# Patient Record
Sex: FEMALE | URBAN - METROPOLITAN AREA
[De-identification: names, ages, dates, MRNs, and addresses within clinical notes are randomized per-mention and may not be internally consistent; named-entity substitution may affect disease eponyms.]

---

## 2018-04-16 ENCOUNTER — APPOINTMENT (RX ONLY)
Dept: URBAN - METROPOLITAN AREA CLINIC 23 | Facility: CLINIC | Age: 58
Setting detail: DERMATOLOGY
End: 2018-04-16

## 2018-04-16 DIAGNOSIS — L82.1 OTHER SEBORRHEIC KERATOSIS: ICD-10-CM

## 2018-04-16 DIAGNOSIS — I83.9 ASYMPTOMATIC VARICOSE VEINS OF LOWER EXTREMITIES: ICD-10-CM

## 2018-04-16 DIAGNOSIS — Z71.89 OTHER SPECIFIED COUNSELING: ICD-10-CM

## 2018-04-16 DIAGNOSIS — L82.0 INFLAMED SEBORRHEIC KERATOSIS: ICD-10-CM

## 2018-04-16 DIAGNOSIS — D18.0 HEMANGIOMA: ICD-10-CM

## 2018-04-16 DIAGNOSIS — D22 MELANOCYTIC NEVI: ICD-10-CM

## 2018-04-16 DIAGNOSIS — L81.4 OTHER MELANIN HYPERPIGMENTATION: ICD-10-CM

## 2018-04-16 DIAGNOSIS — L81.8 OTHER SPECIFIED DISORDERS OF PIGMENTATION: ICD-10-CM

## 2018-04-16 PROBLEM — L40.0 PSORIASIS VULGARIS: Status: ACTIVE | Noted: 2018-04-16

## 2018-04-16 PROBLEM — D22.62 MELANOCYTIC NEVI OF LEFT UPPER LIMB, INCLUDING SHOULDER: Status: ACTIVE | Noted: 2018-04-16

## 2018-04-16 PROBLEM — L85.3 XEROSIS CUTIS: Status: ACTIVE | Noted: 2018-04-16

## 2018-04-16 PROBLEM — D48.5 NEOPLASM OF UNCERTAIN BEHAVIOR OF SKIN: Status: ACTIVE | Noted: 2018-04-16

## 2018-04-16 PROBLEM — L55.1 SUNBURN OF SECOND DEGREE: Status: ACTIVE | Noted: 2018-04-16

## 2018-04-16 PROBLEM — D22.5 MELANOCYTIC NEVI OF TRUNK: Status: ACTIVE | Noted: 2018-04-16

## 2018-04-16 PROBLEM — D22.72 MELANOCYTIC NEVI OF LEFT LOWER LIMB, INCLUDING HIP: Status: ACTIVE | Noted: 2018-04-16

## 2018-04-16 PROBLEM — H91.90 UNSPECIFIED HEARING LOSS, UNSPECIFIED EAR: Status: ACTIVE | Noted: 2018-04-16

## 2018-04-16 PROBLEM — L29.8 OTHER PRURITUS: Status: ACTIVE | Noted: 2018-04-16

## 2018-04-16 PROBLEM — F41.9 ANXIETY DISORDER, UNSPECIFIED: Status: ACTIVE | Noted: 2018-04-16

## 2018-04-16 PROBLEM — D18.01 HEMANGIOMA OF SKIN AND SUBCUTANEOUS TISSUE: Status: ACTIVE | Noted: 2018-04-16

## 2018-04-16 PROBLEM — I83.91 ASYMPTOMATIC VARICOSE VEINS OF RIGHT LOWER EXTREMITY: Status: ACTIVE | Noted: 2018-04-16

## 2018-04-16 PROCEDURE — ? BIOPSY BY SHAVE METHOD

## 2018-04-16 PROCEDURE — ? BENIGN DESTRUCTION

## 2018-04-16 PROCEDURE — 17110 DESTRUCTION B9 LES UP TO 14: CPT

## 2018-04-16 PROCEDURE — 11100: CPT | Mod: 59

## 2018-04-16 PROCEDURE — ? COUNSELING

## 2018-04-16 PROCEDURE — 11101: CPT

## 2018-04-16 PROCEDURE — 99203 OFFICE O/P NEW LOW 30 MIN: CPT | Mod: 25

## 2018-04-16 ASSESSMENT — LOCATION ZONE DERM
LOCATION ZONE: LEG
LOCATION ZONE: ARM
LOCATION ZONE: TRUNK
LOCATION ZONE: FACE

## 2018-04-16 ASSESSMENT — LOCATION DETAILED DESCRIPTION DERM
LOCATION DETAILED: SUPERIOR THORACIC SPINE
LOCATION DETAILED: LEFT ANTERIOR DISTAL UPPER ARM
LOCATION DETAILED: EPIGASTRIC SKIN
LOCATION DETAILED: RIGHT DISTAL MEDIAL CALF
LOCATION DETAILED: LEFT MEDIAL SUPERIOR CHEST
LOCATION DETAILED: RIGHT SUPERIOR UPPER BACK
LOCATION DETAILED: LEFT SUPERIOR MEDIAL UPPER BACK
LOCATION DETAILED: LEFT INFERIOR MEDIAL UPPER BACK
LOCATION DETAILED: LEFT DISTAL POSTERIOR UPPER ARM
LOCATION DETAILED: LEFT DISTAL POSTERIOR THIGH
LOCATION DETAILED: LEFT LATERAL ABDOMEN
LOCATION DETAILED: LEFT ANTECUBITAL SKIN
LOCATION DETAILED: LEFT ANTERIOR DISTAL THIGH
LOCATION DETAILED: LEFT SUPERIOR LATERAL MIDBACK
LOCATION DETAILED: LEFT PROXIMAL DORSAL FOREARM
LOCATION DETAILED: SUPERIOR LUMBAR SPINE
LOCATION DETAILED: LEFT INFERIOR LATERAL UPPER BACK
LOCATION DETAILED: RIGHT MEDIAL SUPERIOR CHEST
LOCATION DETAILED: RIGHT RIB CAGE
LOCATION DETAILED: RIGHT INFERIOR UPPER BACK
LOCATION DETAILED: LEFT MEDIAL UPPER BACK
LOCATION DETAILED: LEFT INFERIOR CENTRAL MALAR CHEEK
LOCATION DETAILED: LEFT INFERIOR UPPER BACK
LOCATION DETAILED: INFERIOR THORACIC SPINE
LOCATION DETAILED: LEFT ELBOW
LOCATION DETAILED: RIGHT ANTERIOR DISTAL THIGH
LOCATION DETAILED: LEFT ANTERIOR PROXIMAL THIGH
LOCATION DETAILED: PERIUMBILICAL SKIN
LOCATION DETAILED: LEFT PROXIMAL PRETIBIAL REGION
LOCATION DETAILED: RIGHT SUPERIOR LATERAL MIDBACK

## 2018-04-16 ASSESSMENT — LOCATION SIMPLE DESCRIPTION DERM
LOCATION SIMPLE: LEFT POSTERIOR UPPER ARM
LOCATION SIMPLE: RIGHT THIGH
LOCATION SIMPLE: RIGHT CALF
LOCATION SIMPLE: LEFT POSTERIOR THIGH
LOCATION SIMPLE: CHEST
LOCATION SIMPLE: LEFT UPPER ARM
LOCATION SIMPLE: LEFT UPPER BACK
LOCATION SIMPLE: LEFT THIGH
LOCATION SIMPLE: LEFT ELBOW
LOCATION SIMPLE: LOWER BACK
LOCATION SIMPLE: LEFT FOREARM
LOCATION SIMPLE: RIGHT UPPER BACK
LOCATION SIMPLE: LEFT CHEEK
LOCATION SIMPLE: UPPER BACK
LOCATION SIMPLE: ABDOMEN
LOCATION SIMPLE: RIGHT LOWER BACK
LOCATION SIMPLE: LEFT LOWER BACK
LOCATION SIMPLE: LEFT PRETIBIAL REGION

## 2018-04-16 NOTE — PROCEDURE: BENIGN DESTRUCTION
Include Z78.9 (Other Specified Conditions Influencing Health Status) As An Associated Diagnosis?: No
Treatment Number (Will Not Render If 0): 0
Anesthesia Volume In Cc: 0.3
Post-Care Instructions: I reviewed with the patient in detail post-care instructions. Patient is to wear sunprotection, and avoid picking at any of the treated lesions. Pt may apply Vaseline to crusted or scabbing areas.
Detail Level: Simple
Render Post-Care Instructions In Note?: yes
Medical Necessity Information: It is in your best interest to select a reason for this procedure from the list below. All of these items fulfill various CMS LCD requirements except the new and changing color options.
Medical Necessity Clause: This procedure was medically necessary because the lesions that were treated were:
Consent: The patient's consent was obtained including but not limited to risks of crusting, scabbing, blistering, scarring, darker or lighter pigmentary change, recurrence, incomplete removal and infection.

## 2018-04-16 NOTE — PROCEDURE: BIOPSY BY SHAVE METHOD
Type Of Destruction Used: Curettage
Consent: Written consent was obtained and risks were reviewed including but not limited to scarring, infection, bleeding, scabbing, incomplete removal, nerve damage and allergy to anesthesia.
X Size Of Lesion In Cm: 0
Curettage Text: The wound bed was treated with curettage after the biopsy was performed.
Electrodesiccation And Curettage Text: The wound bed was treated with electrodesiccation and curettage after the biopsy was performed.
Cryotherapy Text: The wound bed was treated with cryotherapy after the biopsy was performed.
Notification Instructions: Patient will be notified of biopsy results. However, patient instructed to call the office if not contacted within 2 weeks.  Results will be faxed to PCP when they are available.
Post-Care Instructions: I reviewed with the patient in detail post-care instructions. Patient is to keep the biopsy site dry overnight, and then apply vaseline twice daily until healed.
Anesthesia Type: 1% lidocaine with epinephrine
Wound Care: Vaseline
Bill For Surgical Tray: no
Electrodesiccation Text: The wound bed was treated with electrodesiccation after the biopsy was performed.
Hemostasis: Aluminum Chloride
Render Post-Care Instructions In Note?: yes
Anesthesia Volume In Cc: 0.5
Lab: [id number]
Biopsy Method: Dermablade
Billing Type: Third-Party Bill
Silver Nitrate Text: The wound bed was treated with silver nitrate after the biopsy was performed.
Detail Level: Detailed
Dressing: bandage
Biopsy Type: H and E

## 2020-06-14 LAB — SARS-COV-2 N GENE RESP QL NAA+PROBE: NOT DETECTED

## 2020-06-14 PROCEDURE — 87635 SARS-COV-2 COVID-19 AMP PRB: CPT | Performed by: INTERNAL MEDICINE

## 2020-06-17 ENCOUNTER — ANESTHESIA EVENT (OUTPATIENT)
Dept: GASTROENTEROLOGY | Facility: HOSPITAL | Age: 60
End: 2020-06-17

## 2020-06-17 ENCOUNTER — HOSPITAL ENCOUNTER (OUTPATIENT)
Facility: HOSPITAL | Age: 60
Setting detail: HOSPITAL OUTPATIENT SURGERY
Discharge: HOME | End: 2020-06-17
Attending: INTERNAL MEDICINE | Admitting: INTERNAL MEDICINE

## 2020-06-17 ENCOUNTER — ANESTHESIA (OUTPATIENT)
Dept: GASTROENTEROLOGY | Facility: HOSPITAL | Age: 60
End: 2020-06-17

## 2020-06-17 VITALS
OXYGEN SATURATION: 98 % | HEART RATE: 63 BPM | TEMPERATURE: 98 F | WEIGHT: 144.25 LBS | BODY MASS INDEX: 23.18 KG/M2 | HEIGHT: 66 IN | SYSTOLIC BLOOD PRESSURE: 110 MMHG | RESPIRATION RATE: 16 BRPM | DIASTOLIC BLOOD PRESSURE: 59 MMHG

## 2020-06-17 DIAGNOSIS — Z86.010 PERSONAL HISTORY OF COLONIC POLYPS: ICD-10-CM

## 2020-06-17 DIAGNOSIS — K21.9 ESOPHAGEAL REFLUX: ICD-10-CM

## 2020-06-17 PROCEDURE — 25010000002 PROPOFOL 10 MG/ML EMULSION: Performed by: NURSE ANESTHETIST, CERTIFIED REGISTERED

## 2020-06-17 PROCEDURE — 87071 CULTURE AEROBIC QUANT OTHER: CPT | Performed by: INTERNAL MEDICINE

## 2020-06-17 RX ORDER — DEXTROSE AND SODIUM CHLORIDE 5; .45 G/100ML; G/100ML
30 INJECTION, SOLUTION INTRAVENOUS CONTINUOUS PRN
Status: DISCONTINUED | OUTPATIENT
Start: 2020-06-17 | End: 2020-06-17 | Stop reason: HOSPADM

## 2020-06-17 RX ORDER — LIDOCAINE HYDROCHLORIDE 20 MG/ML
INJECTION, SOLUTION INTRAVENOUS AS NEEDED
Status: DISCONTINUED | OUTPATIENT
Start: 2020-06-17 | End: 2020-06-17 | Stop reason: SURG

## 2020-06-17 RX ORDER — ONDANSETRON 2 MG/ML
4 INJECTION INTRAMUSCULAR; INTRAVENOUS ONCE AS NEEDED
Status: DISCONTINUED | OUTPATIENT
Start: 2020-06-17 | End: 2020-06-17 | Stop reason: HOSPADM

## 2020-06-17 RX ORDER — PROPOFOL 10 MG/ML
VIAL (ML) INTRAVENOUS AS NEEDED
Status: DISCONTINUED | OUTPATIENT
Start: 2020-06-17 | End: 2020-06-17 | Stop reason: SURG

## 2020-06-17 RX ADMIN — PROPOFOL 80 MG: 10 INJECTION, EMULSION INTRAVENOUS at 15:13

## 2020-06-17 RX ADMIN — PROPOFOL 20 MG: 10 INJECTION, EMULSION INTRAVENOUS at 15:19

## 2020-06-17 RX ADMIN — DEXTROSE AND SODIUM CHLORIDE 30 ML/HR: 5; 450 INJECTION, SOLUTION INTRAVENOUS at 15:06

## 2020-06-17 RX ADMIN — LIDOCAINE HYDROCHLORIDE 60 MG: 20 INJECTION, SOLUTION INTRAVENOUS at 15:13

## 2020-06-17 RX ADMIN — PROPOFOL 20 MG: 10 INJECTION, EMULSION INTRAVENOUS at 15:21

## 2020-06-17 RX ADMIN — PROPOFOL 20 MG: 10 INJECTION, EMULSION INTRAVENOUS at 15:15

## 2020-06-17 RX ADMIN — PROPOFOL 20 MG: 10 INJECTION, EMULSION INTRAVENOUS at 15:25

## 2020-06-17 RX ADMIN — PROPOFOL 20 MG: 10 INJECTION, EMULSION INTRAVENOUS at 15:17

## 2020-06-17 RX ADMIN — PROPOFOL 20 MG: 10 INJECTION, EMULSION INTRAVENOUS at 15:28

## 2020-06-17 NOTE — ANESTHESIA POSTPROCEDURE EVALUATION
Patient: Nani Anthony    Procedure Summary     Date:  06/17/20 Room / Location:  [facility] ENDOSCOPY 2 / [facility] ENDOSCOPY    Anesthesia Start:  1508 Anesthesia Stop:  1536    Procedures:       ESOPHAGOGASTRODUODENOSCOPY (N/A )      COLONOSCOPY (N/A ) Diagnosis:       Esophageal reflux      Personal history of colonic polyps      (Esophageal reflux [K21.9])      (Personal history of colonic polyps [Z86.010])    Surgeon:  Pool Wolfe DO Provider:  Regan Myers CRNA    Anesthesia Type:  MAC ASA Status:  2          Anesthesia Type: MAC    Vitals  No vitals data found for the desired time range.          Post Anesthesia Care and Evaluation    Patient location during evaluation: PACU  Level of consciousness: awake  Pain score: 0  Pain management: adequate  Airway patency: patent  Anesthetic complications: No anesthetic complications  PONV Status: none  Cardiovascular status: acceptable and hemodynamically stable  Respiratory status: acceptable and spontaneous ventilation  Hydration status: acceptable

## 2020-06-17 NOTE — ANESTHESIA PREPROCEDURE EVALUATION
Anesthesia Evaluation     Patient summary reviewed   NPO Solid Status: > 8 hours  NPO Liquid Status: > 4 hours           Airway   Mallampati: II  TM distance: >3 FB  Dental      Pulmonary - normal exam   (+) a smoker Current Smoked day of surgery,   Cardiovascular - negative cardio ROS and normal exam        Neuro/Psych  GI/Hepatic/Renal/Endo    (+)  GERD,      Musculoskeletal     Abdominal    Substance History      OB/GYN          Other   arthritis,      ROS/Med Hx Other: Hearing loss                Anesthesia Plan    ASA 2     MAC     intravenous induction     Anesthetic plan, all risks, benefits, and alternatives have been provided, discussed and informed consent has been obtained with: patient.

## 2020-06-17 NOTE — H&P
" Mariah Schroeder DO,[facility]  Gastroenterology  Hepatology  Endoscopy  Board Certified in Internal Medicine and gastroenterology  [address], [address]  [place]. [address]  - [phone]   F - [phone]     GASTROENTEROLOGY HISTORY AND PHYSICAL  NOTE   MARIAH SCHROEDER DO.         SUBJECTIVE:   6/17/2020    Name: Nani Anthony  DOD: 1960        Chief Complaint:     Subjective : Dyspepsia.  Personal history of colon polyps.  Generalized abdominal pain.  Irritable bowel syndrome with diarrhea    Patient is 60 y.o. female presents with desire for elective EGD with biopsy and culture as well as colonoscopy with biopsy.      ROS/HISTORY/ CURRENT MEDICATIONS/OBJECTIVE/VS/PE:   Review of Systems:  All systems unremarkable unless specified below.  Constitutional   HENT  Eyes   Respiratory    Cardiovascular  Gastrointestinal   Endocrine  Genitourinary    Musculoskeletal   Skin  Allergic/Immunologic    Neurological    Hematological  Psychiatric/Behavioral    History:   No past medical history on file.  No past surgical history on file.  No family history on file.  Social History     Tobacco Use   • Smoking status: Not on file   Substance Use Topics   • Alcohol use: Not on file   • Drug use: Not on file     Prior to Admission medications    Not on File     Allergies:  Patient has no allergy information on record.    I have reviewed the patients medical history, surgical history and family history in the available medical record system.     Current Medications:     No current facility-administered medications for this encounter.      No current outpatient medications on file.       Objective     Physical Exam:   BP: ()/()   Arterial Line BP: ()/()   /59   Pulse 63   Temp 98 °F (36.7 °C)   Resp 16   Ht 167.6 cm (66\")   Wt 65.4 kg (144 lb 4 oz)   SpO2 98%   BMI 23.28 kg/m²   Physical Exam:  General Appearance:    Alert, cooperative, in no acute distress   Head:    Normocephalic, without obvious " abnormality, atraumatic   Eyes:            Lids and lashes normal, conjunctivae and sclerae normal, no   icterus, no pallor, corneas clear, PERRLA   Ears:    Ears appear intact with no abnormalities noted   Throat:   No oral lesions, no thrush, oral mucosa moist   Neck:   No adenopathy, supple, trachea midline, no thyromegaly, no     carotid bruit, no JVD   Back:     No kyphosis present, no scoliosis present, no skin lesions,       erythema or scars, no tenderness to percussion or                   palpation,   range of motion normal   Lungs:     Clear to auscultation,respirations regular, even and                   unlabored    Heart:    Regular rhythm and normal rate, normal S1 and S2, no            murmur, no gallop, no rub, no click   Breast Exam:    Deferred   Abdomen:     Normal bowel sounds, no masses, no organomegaly, soft        non-tender, non-distended, no guarding, no rebound                 tenderness   Genitalia:    Deferred   Extremities:   Moves all extremities well, no edema, no cyanosis, no              redness   Pulses:   Pulses palpable and equal bilaterally   Skin:   No bleeding, bruising or rash   Lymph nodes:   No palpable adenopathy   Neurologic:   Cranial nerves 2 - 12 grossly intact, sensation intact, DTR        present and equal bilaterally      Results Review:     No results found for: WBC, HGB, HCT, PLT          No results found for: LIPASE  No results found for: INR  No results found for: BLOODCX    Radiology Review:  Imaging Results (Last 72 Hours)     ** No results found for the last 72 hours. **           I reviewed the patient's new clinical results.  I reviewed the patient's new imaging results and agree with the interpretation.     ASSESSMENT/PLAN:   ASSESSMENT:  1.  Dyspepsia  2.  Generalized abdominal pain  3.  Personal history of colon polyps  4.  Diarrhea, functional.  Rule out microscopic or collagenous colitis    PLAN:  1.  Esophagogastroduodenoscopy with biopsy and  culture  2.  Colonoscopy with biopsy    Risk and benefits associated with the procedure are reviewed with the patient.  The patient wished to proceed     Pool Wolfe DO  06/17/20  12:49

## 2020-06-19 LAB — BACTERIA SPEC AEROBE CULT: NORMAL

## 2020-06-22 LAB
LAB AP CASE REPORT: NORMAL
PATH REPORT.FINAL DX SPEC: NORMAL

## 2020-12-29 ENCOUNTER — APPOINTMENT (OUTPATIENT)
Dept: MRI IMAGING | Facility: HOSPITAL | Age: 60
End: 2020-12-29

## 2021-01-07 ENCOUNTER — HOSPITAL ENCOUNTER (OUTPATIENT)
Dept: MRI IMAGING | Facility: HOSPITAL | Age: 61
Discharge: HOME | End: 2021-01-07
Admitting: INTERNAL MEDICINE

## 2021-01-07 DIAGNOSIS — R51.9 NONINTRACTABLE HEADACHE, UNSPECIFIED CHRONICITY PATTERN, UNSPECIFIED HEADACHE TYPE: ICD-10-CM

## 2021-01-07 DIAGNOSIS — R20.0 RT FACIAL NUMBNESS: ICD-10-CM

## 2021-01-07 PROCEDURE — 70553 MRI BRAIN STEM W/O & W/DYE: CPT

## 2021-01-07 PROCEDURE — A9579 GAD-BASE MR CONTRAST NOS,1ML: HCPCS | Performed by: INTERNAL MEDICINE

## 2021-01-07 PROCEDURE — 25010000002 GADOTERIDOL PER 1 ML: Performed by: INTERNAL MEDICINE

## 2021-01-07 RX ADMIN — GADOTERIDOL 15 ML: 279.3 INJECTION, SOLUTION INTRAVENOUS at 13:17

## 2021-10-18 ENCOUNTER — TELEPHONE (OUTPATIENT)
Dept: NEUROLOGY | Age: 61
End: 2021-10-18

## 2021-10-18 NOTE — TELEPHONE ENCOUNTER
Called and spoke with the patient to let them know we have got the referral appointment scheduled. Patient is aware of appointment time, date, and the location. Sent welcome letter.

## 2021-12-08 ENCOUNTER — OFFICE VISIT (OUTPATIENT)
Dept: NEUROLOGY | Age: 61
End: 2021-12-08
Payer: MEDICAID

## 2021-12-08 VITALS
HEART RATE: 62 BPM | SYSTOLIC BLOOD PRESSURE: 122 MMHG | WEIGHT: 158 LBS | BODY MASS INDEX: 25.39 KG/M2 | DIASTOLIC BLOOD PRESSURE: 74 MMHG | HEIGHT: 66 IN

## 2021-12-08 DIAGNOSIS — F41.9 ANXIETY AND DEPRESSION: ICD-10-CM

## 2021-12-08 DIAGNOSIS — F32.A ANXIETY AND DEPRESSION: ICD-10-CM

## 2021-12-08 DIAGNOSIS — R41.3 MEMORY LOSS: ICD-10-CM

## 2021-12-08 DIAGNOSIS — M79.2 NEURALGIA: Primary | ICD-10-CM

## 2021-12-08 PROCEDURE — 99204 OFFICE O/P NEW MOD 45 MIN: CPT | Performed by: PSYCHIATRY & NEUROLOGY

## 2021-12-08 RX ORDER — HYDROXYZINE PAMOATE 25 MG/1
CAPSULE ORAL
COMMUNITY
Start: 2021-11-23

## 2021-12-08 RX ORDER — MENTHOL 5.8 MG/1
LOZENGE ORAL
COMMUNITY
Start: 2021-09-16

## 2021-12-08 RX ORDER — DICYCLOMINE HYDROCHLORIDE 10 MG/1
CAPSULE ORAL
COMMUNITY
Start: 2021-11-10

## 2021-12-08 RX ORDER — CYCLOBENZAPRINE HCL 5 MG
TABLET ORAL
COMMUNITY
Start: 2021-11-19

## 2021-12-08 RX ORDER — OXCARBAZEPINE 150 MG/1
TABLET, FILM COATED ORAL
Qty: 120 TABLET | Refills: 5 | Status: SHIPPED | OUTPATIENT
Start: 2021-12-08 | End: 2022-01-11

## 2021-12-08 RX ORDER — DIAZEPAM 2 MG/1
TABLET ORAL
COMMUNITY
Start: 2021-11-23

## 2021-12-08 NOTE — PROGRESS NOTES
Review of Systems    Constitutional  No fever or chills. No diaphoresis or significant fatigue. HENT   No tinnitus yes significant hearing loss. Eyes  no sudden vision change or eye pain  Respiratory  no significant shortness of breath or cough  Cardiovascular  no chest pain No palpitations or significant leg swelling  Gastrointestinal  no abdominal swelling or pain. Genitourinary  No difficulty urinating, dysuria  Musculoskeletal  no back pain or myalgia. Skin  no color change or rash  Neurologic  No seizures. No lateralizing weakness. Hematologic  no easy bruising or excessive bleeding. Psychiatric  no severe anxiety or nervousness. All other review of systems are negative.

## 2021-12-08 NOTE — PROGRESS NOTES
Chief Complaint   Patient presents with    New Patient     MRI scanned in to media. early onset dementia, pins and needles all over body        Darlyn Crocker is a 59y.o. year old female who is seen for evaluation of head pain. This has been present for about 2 to 3 years. This may occur a few times a week or maybe weeks in between episodes. She gets a sharp stabbing pain which lasts a few seconds. She denies diplopia, dysarthria, dysphagia or visual change with these episodes. This occurs in the right temple primarily although it can occur on the left temple. She does have some numbness and tingling in the back of the head. She has history of bilateral mastoid surgeries. She does have issues with short-term memory. She may forget what she is saying. She does have several head injuries. She has anxiety issues and has not made many friends since moving here. She is able to do all her activities daily without difficulty. She denies any hallucinations or changes in personality. Her MRI of the brain and recently had some minimal small vessel disease and some white matter change in the mireya. She does have some issues of eye twitching. On a few occasions she feels as if she is almost about to have a seizure although she has never had a seizure. She thinks this may be related to anxiety. Active Ambulatory Problems     Diagnosis Date Noted    Neuralgia 12/08/2021    Anxiety and depression 12/08/2021    Memory loss 12/08/2021     Resolved Ambulatory Problems     Diagnosis Date Noted    No Resolved Ambulatory Problems     Past Medical History:   Diagnosis Date    Arthritis     Hearing loss     High cholesterol        Past Surgical History:   Procedure Laterality Date    EAR SURGERY         No family history on file.     Allergies   Allergen Reactions    Sulfa Antibiotics Nausea Only       Social History     Socioeconomic History    Marital status: Single     Spouse name: Not on file    Number of children: Not on file    Years of education: Not on file    Highest education level: Not on file   Occupational History    Not on file   Tobacco Use    Smoking status: Current Every Day Smoker     Packs/day: 1.00    Smokeless tobacco: Never Used   Substance and Sexual Activity    Alcohol use: Yes     Comment: rare     Drug use: Not on file    Sexual activity: Not on file   Other Topics Concern    Not on file   Social History Narrative    Not on file     Social Determinants of Health     Financial Resource Strain:     Difficulty of Paying Living Expenses: Not on file   Food Insecurity:     Worried About Running Out of Food in the Last Year: Not on file    Javid of Food in the Last Year: Not on file   Transportation Needs:     Lack of Transportation (Medical): Not on file    Lack of Transportation (Non-Medical): Not on file   Physical Activity:     Days of Exercise per Week: Not on file    Minutes of Exercise per Session: Not on file   Stress:     Feeling of Stress : Not on file   Social Connections:     Frequency of Communication with Friends and Family: Not on file    Frequency of Social Gatherings with Friends and Family: Not on file    Attends Alevism Services: Not on file    Active Member of [address] [place] [address] or Organizations: Not on file    Attends Club or Organization Meetings: Not on file    Marital Status: Not on file   Intimate Partner Violence:     Fear of Current or Ex-Partner: Not on file    Emotionally Abused: Not on file    Physically Abused: Not on file    Sexually Abused: Not on file   Housing Stability:     Unable to Pay for Housing in the Last Year: Not on file    Number of [place] in the Last Year: Not on file    Unstable Housing in the Last Year: Not on file       Review of Systems     Constitutional  No fever or chills. No diaphoresis or significant fatigue. HENT   No tinnitus yes significant hearing loss.   Eyes  no sudden vision change or eye pain  Respiratory  no significant shortness of breath or cough  Cardiovascular  no chest pain No palpitations or significant leg swelling  Gastrointestinal  no abdominal swelling or pain. Genitourinary  No difficulty urinating, dysuria  Musculoskeletal  no back pain or myalgia. Skin  no color change or rash  Neurologic  No seizures. No lateralizing weakness. Hematologic  no easy bruising or excessive bleeding. Psychiatric  no severe anxiety or nervousness. All other review of systems are negative. Current Outpatient Medications   Medication Sig Dispense Refill    dicyclomine (BENTYL) 10 MG capsule       cyclobenzaprine (FLEXERIL) 5 MG tablet       hydrOXYzine (VISTARIL) 25 MG capsule       diazePAM (VALIUM) 2 MG tablet       V-R STOOL SOFTENER 100 MG capsule       OXcarbazepine (TRILEPTAL) 150 MG tablet 2 tabs bid 120 tablet 5     No current facility-administered medications for this visit. /74   Pulse 62   Ht 5' 5.6\" (1.666 m)   Wt 158 lb (71.7 kg)   BMI 25.81 kg/m²     Constitutional  well developed, well nourished. Eyes  conjunctiva normal.  Pupils not tested  Ear, nose, throat -hearing intact to finger rub No scars, masses, or lesions over external nose or ears, no atrophy of tongue  Neck-symmetric, no masses noted, no jugular vein distension  Respiration- chest wall appears symmetric, good expansion,   normal effort without use of accessory muscles  Musculoskeletal  no significant wasting of muscles noted, no bony deformities  Extremities-no clubbing, cyanosis or edema  Skin  warm, dry, and intact. No rash, erythema, or pallor.   Psychiatric  mood, affect, and behavior appear normal.      Neurological exam  Awake, alert, fluent oriented x 3 appropriate affect  Attention and concentration appear appropriate  Recent and remote memory appears unremarkable  Speech normal without dysarthria  No clear issues with language of fund of knowledge    Cranial Nerve Exam   CN II- Visual fields grossly unremarkable  CN III, IV,VI-EOMI, No nystagmus, conjugate eye movements, no ptosis  CN V-sensation intact to LT over face  CN VII-no facial assymetry  CN VIII-Hearing intact to finger rub  CN IX and X- Palate not tested  CN XI-not test shoulder shrug  CN XII-Tongue midline with no fasciculations or fibrillations    Motor Exam  V/V throughout upper and lower extremities bilaterally, no cogwheeling, normal tone    Sensory Exam  Sensation intact to light touch and temperature upper and lower extremities bilaterally    Reflexes   2+ biceps bilaterally  2+ brachioradialis  2+patella  2+ ankle jerks  No clonus ankles  No Calhoun's sign bilateral hands    Tremors- no tremors in hands or head noted    Gait  Normal base and speed  No ataxia    Coordination  Finger to nose-unremarkable    No results found for: [id number]  No results found for: WBC, HGB, HCT, MCV, PLT  No results found for: NA, K, CL, CO2, BUN, CREATININE, GLUCOSE, CALCIUM, PROT, LABALBU, BILITOT, ALKPHOS, AST, ALT, LABGLOM, GFRAA, AGRATIO, GLOB        Assessment    ICD-10-CM    1. Neuralgia  M79.2    2. Anxiety and depression  F41.9     F32. A    3. Memory loss  R41.3        Her neurological examination today including bedside cognitive screen was relatively unremarkable. Based upon her history and examination, her sharp pains are suggestive of a neuralgia. At this time she was started on Trileptal to see if this provides any benefit. She will also be scheduled for a neurocognitive test to better assess her cognitive issues. The patient indicated understanding of the management plan. She is to follow-up after testing to determine what further management is warranted. Plan    No orders of the defined types were placed in this encounter.       Orders Placed This Encounter   Medications    OXcarbazepine (TRILEPTAL) 150 MG tablet     Si tabs bid     Dispense:  120 tablet     Refill:  5       Return if symptoms worsen or fail to improve. EMR Dragon/transcription disclaimer:Significant part of this  encounter note is electronic transcription/translation of spoken language to printed text. The electronic translation of spoken language may be erroneous, or at times, nonsensical words or phrases may be inadvertently transcribed.  Although I have reviewed the note for such errors, some may still exist.

## 2021-12-10 ENCOUNTER — OFFICE VISIT (OUTPATIENT)
Dept: NEUROLOGY | Age: 61
End: 2021-12-10
Payer: MEDICAID

## 2021-12-10 DIAGNOSIS — R41.3 MEMORY LOSS: Primary | ICD-10-CM

## 2021-12-10 PROCEDURE — 96137 PSYCL/NRPSYC TST PHY/QHP EA: CPT | Performed by: PSYCHIATRY & NEUROLOGY

## 2021-12-10 PROCEDURE — 99211 OFF/OP EST MAY X REQ PHY/QHP: CPT | Performed by: PSYCHIATRY & NEUROLOGY

## 2021-12-10 PROCEDURE — 96136 PSYCL/NRPSYC TST PHY/QHP 1ST: CPT | Performed by: PSYCHIATRY & NEUROLOGY

## 2021-12-10 NOTE — PROGRESS NOTES
Chief Complaint   Patient presents with    Memory Loss       Denise Pedersen is a 59y.o. year old female who is seen for evaluation of head pain. This has been present for about 2 to 3 years. This may occur a few times a week or maybe weeks in between episodes. She gets a sharp stabbing pain which lasts a few seconds. She denies diplopia, dysarthria, dysphagia or visual change with these episodes. This occurs in the right temple primarily although it can occur on the left temple. She does have some numbness and tingling in the back of the head. She has history of bilateral mastoid surgeries. She does have issues with short-term memory. She may forget what she is saying. She does have several head injuries. She has anxiety issues and has not made many friends since moving here. She is able to do all her activities daily without difficulty. She denies any hallucinations or changes in personality. Her MRI of the brain and recently had some minimal small vessel disease and some white matter change in the mireya. She does have some issues of eye twitching. On a few occasions she feels as if she is almost about to have a seizure although she has never had a seizure. She thinks this may be related to anxiety. Active Ambulatory Problems     Diagnosis Date Noted    Neuralgia 12/08/2021    Anxiety and depression 12/08/2021    Memory loss 12/08/2021     Resolved Ambulatory Problems     Diagnosis Date Noted    No Resolved Ambulatory Problems     Past Medical History:   Diagnosis Date    Arthritis     Hearing loss     High cholesterol        Past Surgical History:   Procedure Laterality Date    EAR SURGERY         No family history on file.     Allergies   Allergen Reactions    Sulfa Antibiotics Nausea Only       Social History     Socioeconomic History    Marital status: Single     Spouse name: Not on file    Number of children: Not on file    Years of education: Not on file    Highest education level: Not on file   Occupational History    Not on file   Tobacco Use    Smoking status: Current Every Day Smoker     Packs/day: 1.00    Smokeless tobacco: Never Used   Substance and Sexual Activity    Alcohol use: Yes     Comment: rare     Drug use: Not on file    Sexual activity: Not on file   Other Topics Concern    Not on file   Social History Narrative    Not on file     Social Determinants of Health     Financial Resource Strain:     Difficulty of Paying Living Expenses: Not on file   Food Insecurity:     Worried About Running Out of Food in the Last Year: Not on file    Javid of Food in the Last Year: Not on file   Transportation Needs:     Lack of Transportation (Medical): Not on file    Lack of Transportation (Non-Medical):  Not on file   Physical Activity:     Days of Exercise per Week: Not on file    Minutes of Exercise per Session: Not on file   Stress:     Feeling of Stress : Not on file   Social Connections:     Frequency of Communication with Friends and Family: Not on file    Frequency of Social Gatherings with Friends and Family: Not on file    Attends Yazdanism Services: Not on file    Active Member of [address] [place] [address] or Organizations: Not on file    Attends Club or Organization Meetings: Not on file    Marital Status: Not on file   Intimate Partner Violence:     Fear of Current or Ex-Partner: Not on file    Emotionally Abused: Not on file    Physically Abused: Not on file    Sexually Abused: Not on file   Housing Stability:     Unable to Pay for Housing in the Last Year: Not on file    Number of [place] in the Last Year: Not on file    Unstable Housing in the Last Year: Not on file         Current Outpatient Medications   Medication Sig Dispense Refill    dicyclomine (BENTYL) 10 MG capsule       cyclobenzaprine (FLEXERIL) 5 MG tablet       hydrOXYzine (VISTARIL) 25 MG capsule       diazePAM (VALIUM) 2 MG tablet       V-R STOOL SOFTENER 100 MG capsule       OXcarbazepine (TRILEPTAL) 150 MG tablet 2 tabs bid 120 tablet 5     No current facility-administered medications for this visit. There were no vitals taken for this visit. No results found for: [id number]  No results found for: WBC, HGB, HCT, MCV, PLT  No results found for: NA, K, CL, CO2, BUN, CREATININE, GLUCOSE, CALCIUM, PROT, LABALBU, BILITOT, ALKPHOS, AST, ALT, LABGLOM, GFRAA, AGRATIO, GLOB        Assessment    ICD-10-CM    1. Memory loss  R41.3 NH PSYCL/NRPSYCL TST PHYS/QHP 2+ TST EA ADDL 30 MIN     NH PSYL/NRPSYCL TST PHYS/QHP 2+ TST 1ST 30 MIN       Patient is here for neuropsych testing. Anxiety and depression scales were filled out. Patient instructed on how to perform neuropsych test. Test time approximately 1 hour. Plan    Orders Placed This Encounter   Procedures    NH PSYCL/NRPSYCL TST PHYS/QHP 2+ TST EA ADDL 30 MIN    NH PSYL/NRPSYCL TST PHYS/QHP 2+ TST 1ST 30 MIN       No orders of the defined types were placed in this encounter. Return in about 3 weeks (around 12/31/2021). EMR Dragon/transcription disclaimer:Significant part of this  encounter note is electronic transcription/translation of spoken language to printed text. The electronic translation of spoken language may be erroneous, or at times, nonsensical words or phrases may be inadvertently transcribed.  Although I have reviewed the note for such errors, some may still exist.

## 2021-12-17 ENCOUNTER — TELEPHONE (OUTPATIENT)
Dept: NEUROLOGY | Age: 61
End: 2021-12-17

## 2022-01-11 ENCOUNTER — OFFICE VISIT (OUTPATIENT)
Dept: NEUROLOGY | Age: 62
End: 2022-01-11
Payer: MEDICAID

## 2022-01-11 VITALS
HEART RATE: 59 BPM | SYSTOLIC BLOOD PRESSURE: 91 MMHG | DIASTOLIC BLOOD PRESSURE: 61 MMHG | BODY MASS INDEX: 25.39 KG/M2 | WEIGHT: 158 LBS | HEIGHT: 66 IN

## 2022-01-11 DIAGNOSIS — F41.9 ANXIETY AND DEPRESSION: ICD-10-CM

## 2022-01-11 DIAGNOSIS — F32.A ANXIETY AND DEPRESSION: ICD-10-CM

## 2022-01-11 DIAGNOSIS — R41.3 MEMORY LOSS: Primary | ICD-10-CM

## 2022-01-11 DIAGNOSIS — M79.2 NEURALGIA: ICD-10-CM

## 2022-01-11 PROCEDURE — 96132 NRPSYC TST EVAL PHYS/QHP 1ST: CPT | Performed by: PSYCHIATRY & NEUROLOGY

## 2022-01-11 RX ORDER — OXCARBAZEPINE 150 MG/1
TABLET, FILM COATED ORAL
Qty: 90 TABLET | Refills: 5 | Status: SHIPPED | OUTPATIENT
Start: 2022-01-11 | End: 2022-04-12

## 2022-01-11 NOTE — PROGRESS NOTES
Chief Complaint   Patient presents with    Memory Loss     memory test results        Phuong King is a 59y.o. year old female who is seen for evaluation of head pain. This has been present for about 2 to 3 years. This may occur a few times a week or maybe weeks in between episodes. She gets a sharp stabbing pain which lasts a few seconds. She denies diplopia, dysarthria, dysphagia or visual change with these episodes. This occurs in the right temple primarily although it can occur on the left temple. She does have some numbness and tingling in the back of the head. She has history of bilateral mastoid surgeries. She does have issues with short-term memory. She may forget what she is saying. She does have several head injuries. She has anxiety issues and has not made many friends since moving here. She is able to do all her activities daily without difficulty. She denies any hallucinations or changes in personality. Her MRI of the brain and recently had some minimal small vessel disease and some white matter change in the mireya. She does have some issues of eye twitching. On a few occasions she feels as if she is almost about to have a seizure although she has never had a seizure. She thinks this may be related to anxiety. Insurance not cover 4 trileptal a day. Active Ambulatory Problems     Diagnosis Date Noted    Neuralgia 12/08/2021    Anxiety and depression 12/08/2021    Memory loss 12/08/2021     Resolved Ambulatory Problems     Diagnosis Date Noted    No Resolved Ambulatory Problems     Past Medical History:   Diagnosis Date    Arthritis     Hearing loss     High cholesterol        Past Surgical History:   Procedure Laterality Date    EAR SURGERY         History reviewed. No pertinent family history.     Allergies   Allergen Reactions    Sulfa Antibiotics Nausea Only       Social History     Socioeconomic History    Marital status: Single     Spouse name: Not on file    Number of children: Not on file    Years of education: Not on file    Highest education level: Not on file   Occupational History    Not on file   Tobacco Use    Smoking status: Current Every Day Smoker     Packs/day: 0.75    Smokeless tobacco: Never Used   Substance and Sexual Activity    Alcohol use: Yes     Comment: rare     Drug use: Not on file    Sexual activity: Not on file   Other Topics Concern    Not on file   Social History Narrative    Not on file     Social Determinants of Health     Financial Resource Strain:     Difficulty of Paying Living Expenses: Not on file   Food Insecurity:     Worried About Running Out of Food in the Last Year: Not on file    Javid of Food in the Last Year: Not on file   Transportation Needs:     Lack of Transportation (Medical): Not on file    Lack of Transportation (Non-Medical): Not on file   Physical Activity:     Days of Exercise per Week: Not on file    Minutes of Exercise per Session: Not on file   Stress:     Feeling of Stress : Not on file   Social Connections:     Frequency of Communication with Friends and Family: Not on file    Frequency of Social Gatherings with Friends and Family: Not on file    Attends Anabaptist Services: Not on file    Active Member of [address] [place] [address] or Organizations: Not on file    Attends Club or Organization Meetings: Not on file    Marital Status: Not on file   Intimate Partner Violence:     Fear of Current or Ex-Partner: Not on file    Emotionally Abused: Not on file    Physically Abused: Not on file    Sexually Abused: Not on file   Housing Stability:     Unable to Pay for Housing in the Last Year: Not on file    Number of [place] in the Last Year: Not on file    Unstable Housing in the Last Year: Not on file     Review of Systems     Constitutional  No fever or chills. No diaphoresis or significant fatigue. HENT   No tinnitus yes significant hearing loss.   Eyes  no sudden vision change or eye pain  Respiratory  no significant shortness of breath or cough  Cardiovascular  no chest pain No palpitations or significant leg swelling  Gastrointestinal  no abdominal swelling or pain. Genitourinary  No difficulty urinating, dysuria  Musculoskeletal  no back pain or myalgia. Skin  no color change or rash  Neurologic  No seizures. No lateralizing weakness. Hematologic  no easy bruising or excessive bleeding. Psychiatric  no severe anxiety or nervousness. All other review of systems are negative. Current Outpatient Medications   Medication Sig Dispense Refill    OXcarbazepine (TRILEPTAL) 150 MG tablet 1 tab tid 90 tablet 5    dicyclomine (BENTYL) 10 MG capsule       cyclobenzaprine (FLEXERIL) 5 MG tablet       hydrOXYzine (VISTARIL) 25 MG capsule       diazePAM (VALIUM) 2 MG tablet       V-R STOOL SOFTENER 100 MG capsule        No current facility-administered medications for this visit. BP 91/61   Pulse 59   Ht 5' 5.6\" (1.666 m)   Wt 158 lb (71.7 kg)   BMI 25.81 kg/m²     Constitutional  well developed, well nourished. Eyes  conjunctiva normal.  Ear, nose, throat - No scars, masses, or lesions over external nose or ears, no atrophy of tongue  Neck-symmetric, no masses noted, no jugular vein distension  Respiration- chest wall appears symmetric, good expansion,   normal effort without use of accessory muscles  Musculoskeletal  no significant wasting of muscles noted, no bony deformities  Extremities-no clubbing, cyanosis or edema  Skin  warm, dry, and intact. No rash, erythema, or pallor.   Psychiatric  mood, affect, and behavior appear normal.      Neurological exam  Awake, alert, fluent oriented  appropriate affect  Attention and concentration appear appropriate  Recent and remote memory appears unremarkable  Speech normal without dysarthria  No clear issues with language of fund of knowledge    Cranial Nerve Exam     CN III, IV,VI-EOMI, No nystagmus, conjugate eye movements, no ptosis  CN VII-no facial assymetry        Motor Exam  antigravity throughout upper and lower extremities bilaterally    Tremors- no tremors in hands or head noted    Gait  Normal base and speed  No ataxia    No results found for: [id number]  No results found for: WBC, HGB, HCT, MCV, PLT  No results found for: NA, K, CL, CO2, BUN, CREATININE, GLUCOSE, CALCIUM, PROT, LABALBU, BILITOT, ALKPHOS, AST, ALT, LABGLOM, GFRAA, AGRATIO, GLOB        Assessment    ICD-10-CM    1. Memory loss  R41.3 [place] NEUROPSYCHOLOGICAL TST EVAL PHYS/QHP 1ST HOUR   2. Neuralgia  M79.2    3. Anxiety and depression  F41.9     F32. A        Her neurological examination initially including bedside cognitive screen was relatively unremarkable. Based upon her history and examination, her sharp pains are suggestive of a neuralgia. At this time she was started on Trileptal to see if this provides any benefit up to150 mg tid. Her neurocognitive test to better assess her cognitive issues revealed evidence of moderate impairment in executive function, information processing, and verbal function of unclear significance. This was nondiagnostic. The patient indicated understanding of the management plan. We may try medicine for memory at another visit. She is to follow up in 3 months and call with any issues. At least 31 minutes were spent in the evaluation and interpretation of the neurocognitive test along with discussion of the results and a treatment plan with the patient and any family members    Plan    Orders Placed This Encounter   Procedures    [place] NEUROPSYCHOLOGICAL TST EVAL PHYS/QHP 1ST HOUR       Orders Placed This Encounter   Medications    OXcarbazepine (TRILEPTAL) 150 MG tablet     Si tab tid     Dispense:  90 tablet     Refill:  5       Return in about 3 months (around 2022).       EMR Dragon/transcription disclaimer:Significant part of this  encounter note is electronic transcription/translation of spoken language to printed text. The electronic translation of spoken language may be erroneous, or at times, nonsensical words or phrases may be inadvertently transcribed.  Although I have reviewed the note for such errors, some may still exist.

## 2022-03-22 PROCEDURE — G0123 SCREEN CERV/VAG THIN LAYER: HCPCS | Performed by: PHYSICIAN ASSISTANT

## 2022-03-22 PROCEDURE — 87624 HPV HI-RISK TYP POOLED RSLT: CPT | Performed by: PHYSICIAN ASSISTANT

## 2022-03-24 ENCOUNTER — LAB REQUISITION (OUTPATIENT)
Dept: LAB | Facility: HOSPITAL | Age: 62
End: 2022-03-24

## 2022-03-24 DIAGNOSIS — Z01.419 ENCOUNTER FOR GYNECOLOGICAL EXAMINATION (GENERAL) (ROUTINE) WITHOUT ABNORMAL FINDINGS: ICD-10-CM

## 2022-03-25 LAB
GEN CATEG CVX/VAG CYTO-IMP: NORMAL
HPV I/H RISK 4 DNA CVX QL PROBE+SIG AMP: NOT DETECTED
LAB AP CASE REPORT: NORMAL
LAB AP GYN ADDITIONAL INFORMATION: NORMAL
PATH INTERP SPEC-IMP: NORMAL
STAT OF ADQ CVX/VAG CYTO-IMP: NORMAL

## 2022-04-12 ENCOUNTER — OFFICE VISIT (OUTPATIENT)
Dept: NEUROLOGY | Age: 62
End: 2022-04-12
Payer: MEDICAID

## 2022-04-12 VITALS
WEIGHT: 158 LBS | BODY MASS INDEX: 25.39 KG/M2 | HEIGHT: 66 IN | HEART RATE: 52 BPM | DIASTOLIC BLOOD PRESSURE: 71 MMHG | SYSTOLIC BLOOD PRESSURE: 115 MMHG

## 2022-04-12 DIAGNOSIS — M79.2 NEURALGIA: Primary | ICD-10-CM

## 2022-04-12 DIAGNOSIS — F41.9 ANXIETY AND DEPRESSION: ICD-10-CM

## 2022-04-12 DIAGNOSIS — F32.A ANXIETY AND DEPRESSION: ICD-10-CM

## 2022-04-12 DIAGNOSIS — R41.3 MEMORY LOSS: ICD-10-CM

## 2022-04-12 PROCEDURE — 99213 OFFICE O/P EST LOW 20 MIN: CPT | Performed by: PSYCHIATRY & NEUROLOGY

## 2022-04-12 NOTE — PROGRESS NOTES
Chief Complaint   Patient presents with    Memory Loss       Tamika Thakkar is a 59y.o. year old female who is seen for evaluation of head pain. This has been present for about 2 to 3 years. This may occur a few times a week or maybe weeks in between episodes. She gets a sharp stabbing pain which lasts a few seconds. She denies diplopia, dysarthria, dysphagia or visual change with these episodes. This occurs in the right temple primarily although it can occur on the left temple. She does have some numbness and tingling in the back of the head. She has history of bilateral mastoid surgeries. She does have issues with short-term memory. She may forget what she is saying. She does have several head injuries. She has anxiety issues and has not made many friends since moving here. She is able to do all her activities daily without difficulty. She denies any hallucinations or changes in personality. Her MRI of the brain and recently had some minimal small vessel disease and some white matter change in the mireya. She does have some issues of eye twitching. On a few occasions she feels as if she is almost about to have a seizure although she has never had a seizure. She thinks this may be related to anxiety. Insurance not cover 4 trileptal a day. Lots of sinus issues, Stopped Trileptal because gave her headaches. Active Ambulatory Problems     Diagnosis Date Noted    Neuralgia 12/08/2021    Anxiety and depression 12/08/2021    Memory loss 12/08/2021     Resolved Ambulatory Problems     Diagnosis Date Noted    No Resolved Ambulatory Problems     Past Medical History:   Diagnosis Date    Arthritis     Hearing loss     High cholesterol     Memory disorder        Past Surgical History:   Procedure Laterality Date    EAR SURGERY         History reviewed. No pertinent family history.     Allergies   Allergen Reactions    Sulfa Antibiotics Nausea Only       Social History     Socioeconomic History    Marital status: Single     Spouse name: Not on file    Number of children: Not on file    Years of education: Not on file    Highest education level: Not on file   Occupational History    Not on file   Tobacco Use    Smoking status: Current Every Day Smoker     Packs/day: 0.75    Smokeless tobacco: Never Used   Substance and Sexual Activity    Alcohol use: Yes     Comment: rare     Drug use: Not on file    Sexual activity: Not on file   Other Topics Concern    Not on file   Social History Narrative    Not on file     Social Determinants of Health     Financial Resource Strain:     Difficulty of Paying Living Expenses: Not on file   Food Insecurity:     Worried About Running Out of Food in the Last Year: Not on file    Javid of Food in the Last Year: Not on file   Transportation Needs:     Lack of Transportation (Medical): Not on file    Lack of Transportation (Non-Medical): Not on file   Physical Activity:     Days of Exercise per Week: Not on file    Minutes of Exercise per Session: Not on file   Stress:     Feeling of Stress : Not on file   Social Connections:     Frequency of Communication with Friends and Family: Not on file    Frequency of Social Gatherings with Friends and Family: Not on file    Attends Yarsani Services: Not on file    Active Member of [address] [place] [address] [employer] or Organizations: Not on file    Attends Club or Organization Meetings: Not on file    Marital Status: Not on file   Intimate Partner Violence:     Fear of Current or Ex-Partner: Not on file    Emotionally Abused: Not on file    Physically Abused: Not on file    Sexually Abused: Not on file   Housing Stability:     Unable to Pay for Housing in the Last Year: Not on file    Number of [place] in the Last Year: Not on file    Unstable Housing in the Last Year: Not on file     Review of Systems     Constitutional  No fever or chills. No diaphoresis or significant fatigue.   HENT   No tinnitus yes significant hearing loss.  Eyes  no sudden vision change or eye pain  Respiratory  no significant shortness of breath or cough  Cardiovascular  no chest pain No palpitations or significant leg swelling  Gastrointestinal  no abdominal swelling or pain. Genitourinary  No difficulty urinating, dysuria  Musculoskeletal  no back pain or myalgia. Skin  no color change or rash  Neurologic  No seizures. No lateralizing weakness. Hematologic  no easy bruising or excessive bleeding. Psychiatric  no severe anxiety or nervousness. All other review of systems are negative. Current Outpatient Medications   Medication Sig Dispense Refill    dicyclomine (BENTYL) 10 MG capsule       cyclobenzaprine (FLEXERIL) 5 MG tablet       hydrOXYzine (VISTARIL) 25 MG capsule       diazePAM (VALIUM) 2 MG tablet       V-R STOOL SOFTENER 100 MG capsule        No current facility-administered medications for this visit. /71   Pulse 52   Ht 5' 5.6\" (1.666 m)   Wt 158 lb (71.7 kg)   BMI 25.81 kg/m²     Constitutional  well developed, well nourished. Eyes  conjunctiva normal.  Ear, nose, throat - No scars, masses, or lesions over external nose or ears, no atrophy of tongue  Neck-symmetric, no masses noted, no jugular vein distension  Respiration- chest wall appears symmetric, good expansion,   normal effort without use of accessory muscles  Musculoskeletal  no significant wasting of muscles noted, no bony deformities  Extremities-no clubbing, cyanosis or edema  Skin  warm, dry, and intact. No rash, erythema, or pallor.   Psychiatric  mood, affect, and behavior appear normal.      Neurological exam  Awake, alert, fluent oriented  appropriate affect  Attention and concentration appear appropriate  Recent and remote memory appears unremarkable  Speech normal without dysarthria  No clear issues with language of fund of knowledge    Cranial Nerve Exam     CN III, IV,VI-EOMI, No nystagmus, conjugate eye movements, no ptosis  CN VII-no facial assymetry        Motor Exam  antigravity throughout upper and lower extremities bilaterally    Tremors- no tremors in hands or head noted    Gait  Normal base and speed  No ataxia    No results found for: [id number]  No results found for: WBC, HGB, HCT, MCV, PLT  No results found for: NA, K, CL, CO2, BUN, CREATININE, GLUCOSE, CALCIUM, PROT, LABALBU, BILITOT, ALKPHOS, AST, ALT, LABGLOM, GFRAA, AGRATIO, GLOB        Assessment    ICD-10-CM    1. Neuralgia  M79.2    2. Memory loss  R41.3    3. Anxiety and depression  F41.9     F32. A        Her neurological examination initially including bedside cognitive screen was relatively unremarkable. Based upon her history and examination, her sharp pains are suggestive of a neuralgia. At this time she is off Trileptal  Her neurocognitive test to better assess her cognitive issues revealed evidence of moderate impairment in executive function, information processing, and verbal function of unclear significance. This was nondiagnostic. The patient indicated understanding of the management plan. We may try medicine for memory at another visit. She is to follow up with me on a PRN basis and call with any issues. Plan    No orders of the defined types were placed in this encounter. No orders of the defined types were placed in this encounter. Return if symptoms worsen or fail to improve. EMR Dragon/transcription disclaimer:Significant part of this  encounter note is electronic transcription/translation of spoken language to printed text. The electronic translation of spoken language may be erroneous, or at times, nonsensical words or phrases may be inadvertently transcribed.  Although I have reviewed the note for such errors, some may still exist.

## 2022-04-12 NOTE — PROGRESS NOTES
Review of Systems    Constitutional  No fever or chills. No diaphoresis or significant fatigue. HENT   No tinnitus or significant hearing loss. Eyes  no sudden vision change or eye pain  Respiratory  yes significant shortness of breath or cough  Cardiovascular  no chest pain No palpitations or significant leg swelling  Gastrointestinal  yes abdominal swelling or pain. Genitourinary  No difficulty urinating, dysuria  Musculoskeletal  no back pain or myalgia. Skin  no color change or rash  Neurologic  No seizures. No lateralizing weakness. Hematologic  no easy bruising or excessive bleeding. Psychiatric  no severe anxiety or nervousness. All other review of systems are negative.

## 2023-03-09 ENCOUNTER — LAB REQUISITION (OUTPATIENT)
Dept: LAB | Facility: HOSPITAL | Age: 63
End: 2023-03-09
Payer: MEDICAID

## 2023-03-09 DIAGNOSIS — Z00.00 ENCOUNTER FOR GENERAL ADULT MEDICAL EXAMINATION WITHOUT ABNORMAL FINDINGS: ICD-10-CM

## 2023-03-09 PROCEDURE — 88305 TISSUE EXAM BY PATHOLOGIST: CPT | Performed by: GENERAL PRACTICE

## 2023-03-12 LAB
CYTO UR: NORMAL
LAB AP CASE REPORT: NORMAL
LAB AP CLINICAL INFORMATION: NORMAL
Lab: NORMAL
PATH REPORT.FINAL DX SPEC: NORMAL
PATH REPORT.GROSS SPEC: NORMAL

## 2023-06-30 ENCOUNTER — OFFICE VISIT (OUTPATIENT)
Dept: NEUROLOGY | Age: 63
End: 2023-06-30
Payer: MEDICARE

## 2023-06-30 VITALS
WEIGHT: 158 LBS | DIASTOLIC BLOOD PRESSURE: 78 MMHG | HEART RATE: 59 BPM | HEIGHT: 66 IN | BODY MASS INDEX: 25.39 KG/M2 | SYSTOLIC BLOOD PRESSURE: 129 MMHG

## 2023-06-30 DIAGNOSIS — F41.9 ANXIETY AND DEPRESSION: ICD-10-CM

## 2023-06-30 DIAGNOSIS — M79.2 NEURALGIA: Primary | ICD-10-CM

## 2023-06-30 DIAGNOSIS — F32.A ANXIETY AND DEPRESSION: ICD-10-CM

## 2023-06-30 DIAGNOSIS — R41.3 MEMORY LOSS: ICD-10-CM

## 2023-06-30 PROCEDURE — 99214 OFFICE O/P EST MOD 30 MIN: CPT | Performed by: PSYCHIATRY & NEUROLOGY

## 2023-06-30 PROCEDURE — 1123F ACP DISCUSS/DSCN MKR DOCD: CPT | Performed by: PSYCHIATRY & NEUROLOGY

## 2023-06-30 RX ORDER — CIPROFLOXACIN 500 MG/1
500 TABLET, FILM COATED ORAL EVERY 12 HOURS
COMMUNITY
Start: 2023-06-28

## 2023-06-30 RX ORDER — FAMOTIDINE 10 MG
TABLET ORAL
COMMUNITY
Start: 2020-12-02

## 2023-06-30 RX ORDER — ASPIRIN 81 MG/1
81 TABLET ORAL DAILY
COMMUNITY

## 2023-08-11 NOTE — PROGRESS NOTES
YOB: 1960  Location: [facility]  Location Address: [address] [place] [address], [facility], [address] [place] [address]  Location Phone: [phone]    Chief Complaint   Patient presents with    Ear Problem    Sinus Problem       History of Present Illness  Nani Anthony is a 63 y.o. female.  Nani Anthony is here for follow up of ENT complaints. The patient has had problems with right ear pain, throat clearing, sinusitis, nasal congestion, post nasal drainage, decreased hearing, sinus pain, and sinus pressure.  She has not been using nasal sprays as she states they give her a headache and make her feel bad.    She has a history of bilateral mastoid surgery and cholesteatoma removal of both ears 6 years ago in [place], [place]. She also is s/p BAHA placement to the left ear 5 years ago in [place], [place] as well. She has not had a recent hearing test.     She has a history of sleep apnea and uses a CPAP nightly.   She has a history of degenerative disc disease.      She recently had her esophagus stretched three months ago by Dr. Back. She has a history of TIF procedure.    She has a history of sinus surgery in .    Procedure visit with Yesica Vega AUD (08/15/2023)     Study Result    Narrative & Impression   EXAMINATION: CT SINUS WO CONTRAST-      2023 12:44 PM CDT     HISTORY: Sinusitis, chronic or recurrent; J32.9-Chronic sinusitis,  unspecified; J34.89-Other specified disorders of nose and nasal sinuses;  R09.81-Nasal congestion; R09.82-Postnasal drip     In order to have a CT radiation dose as low as reasonably achievable  Automated Exposure Control was utilized for adjustment of the mA and/or  KV according to patient size.     DLP in mGycm= 206     The CT scan of the paranasal sinuses performed without intravenous  contrast enhancement.     Images are acquired in axial plane and subsequent reconstruction in  coronal and sagittal planes.     There is no previous similar study for comparison.     The  maxillary antrum bilaterally are clear. No mucosal thickening. No  fluid levels. The ostium and infundibulum are patent. The uncinate  processes are intact.     The ethmoid air cells are normal. No mucosal thickening. No fluid  levels.     Sphenoethmoid recesses bilaterally are patent.     There is lobulated soft tissues nodule/nodules in the right sphenoid  sinus with a mild smooth expansion of the hypoinflated right sphenoid  sinus. Left sphenoid sinuses are clear.     The frontal sinuses are hypoplastic. No mucosal thickening. Frontonasal  recesses are patent.     There is no significant septal deviation. No septal spur.     There is a probable inferior turbinectomy.     There are very small lamellar conchae bullosa of the middle turbinate  right larger than the left. There is a probable fluid in the right  octavio bullosa     The orbits bilaterally appear normal. The lamina papyracea are intact.     Normal fovea ethmoidalis are seen.     Type II olfactory fossa are seen. The cribriform plate is intact. Normal  olfactory sinuses are noted.     There is a bilateral wall down mastoidectomy. No visible mastoid air  cells on either side. There are ill-defined soft tissue density/nodules  in the mid ureter bilaterally, right more than the left. There is soft  tissue densities in the eustachian tubes bilaterally, right more than  the left and bilateral blockage or eustachian tubes.     There is moderate diffuse lobulated thickening of the posterior wall of  the nasopharynx suggesting adenoid hypertrophy. There is narrowing of  the nasopharyngeal airway. Parapharyngeal spaces/fat lines are intact.     IMPRESSION:  1. The hypoplastic frontal and sphenoid sinuses.  2. Soft tissue nodules in the right sphenoid sinus with a moderate  smooth expansion. This may represent a mucocele/polyp?  3. The ostiomeatal passages are open.  4. Probable bilateral inferior turbinectomy?.  5. A probable complicated octavio bullosa of the  right middle turbinate?.  6. The bilateral wall down mastoidectomy.  7. Soft tissue nodule/densities in the middle ear and eustachian tubes  bilaterally may represent chronic inflammatory  process/cholesteatoma/granulation?. This may be clinically correlated  and further evaluated.  8. Adenoid hypertrophy with moderate compromise of the nasopharyngeal  airway.               This report was finalized on 07/07/2023 14:51 by Dr. Chely Benson MD.        Past Medical History:   Diagnosis Date    Allergic rhinitis Years ago    Dental disease Years    Dizziness Years    Fracture of nasal bones 1978    GERD (gastroesophageal reflux disease) 2014    Headache Off and on for years    HL (hearing loss) Years    Sleep apnea 2021    Tinnitus For years    TMJ dysfunction 2021       Past Surgical History:   Procedure Laterality Date    BREAST SURGERY  1986    COLONOSCOPY N/A 06/17/2020    Procedure: COLONOSCOPY;  Surgeon: Pool Wolfe DO;  Location: [facility] ENDOSCOPY;  Service: Gastroenterology;  Laterality: N/A;    COSMETIC SURGERY  1998    ENDOSCOPY N/A 06/17/2020    Procedure: ESOPHAGOGASTRODUODENOSCOPY;  Surgeon: Pool Wolfe DO;  Location: [facility] ENDOSCOPY;  Service: Gastroenterology;  Laterality: N/A;    TONSILLECTOMY  1963       Outpatient Medications Marked as Taking for the 8/15/23 encounter (Office Visit) with Chandler Hsu MD   Medication Sig Dispense Refill    cyclobenzaprine (FLEXERIL) 5 MG tablet Take 1 tablet by mouth 3 (Three) Times a Day As Needed for Muscle Spasms.      diazePAM (VALIUM) 2 MG tablet Take 1 tablet by mouth 2 (Two) Times a Day As Needed for Anxiety.         Baclofen, Atorvastatin, Iodinated contrast media, Pregabalin, and Sulfa antibiotics    Family History   Problem Relation Age of Onset    Diabetes Mother     Heart failure Father     Diabetes Maternal Grandmother     Cancer Sister     Cancer Brother     Rheum arthritis Sister     Rashes / Skin problems Brother        Social  History     Socioeconomic History    Marital status: Single   Tobacco Use    Smoking status: Every Day     Packs/day: 1.00     Years: 52.00     Pack years: 52.00     Types: Cigarettes    Smokeless tobacco: Never   Vaping Use    Vaping Use: Never used   Substance and Sexual Activity    Alcohol use: Not Currently     Comment: Once in a while socially    Drug use: Never       Review of Systems   Constitutional: Negative.    HENT:  Positive for congestion, hearing loss, sinus pressure and sinus pain.    Respiratory: Negative.     Cardiovascular: Negative.    Genitourinary: Negative.      Vitals:    08/15/23 1129   BP: 131/89   Pulse: 59   Temp: 96.9 øF (36.1 øC)       Body mass index is 22.78 kg/mý.    Objective     Physical Exam  Vitals reviewed.   Constitutional:       Appearance: Normal appearance.   HENT:      Head: Normocephalic.        Right Ear: Decreased hearing noted.      Left Ear: Decreased hearing noted.      Ears:      Comments: Surgical changes to bilateral ear canal and tympanic membranes       Nose: Septal deviation present.      Mouth/Throat:      Lips: Pink.      Mouth: Mucous membranes are moist.      Pharynx: Oropharynx is clear.   Musculoskeletal:      Cervical back: Full passive range of motion without pain.   Neurological:      Mental Status: She is alert.   Psychiatric:         Behavior: Behavior is cooperative.       Assessment & Plan   Diagnoses and all orders for this visit:    1. Nasal congestion (Primary)    2. Recurrent sinus infections    3. Sinus pain    4. H/O cholesteatoma    5. Adenoid hypertrophy    6. Mixed conductive and sensorineural hearing loss of both ears    7. H/O mastoidectomy    8. Post-nasal drip    Other orders  -     mupirocin (BACTROBAN) 2 % nasal ointment; 1 application  into the nostril(s) as directed by provider 2 (Two) Times a Day for 14 days.  Dispense: 28 g; Refill: 0      * Surgery not found *  No orders of the defined types were placed in this  encounter.    Consider latera implant and adenoidectomy  Continue BAHA trial to right ear Thursday  Nasal ointment for 1 week before restarting both nasal sprays  Return for problems    Dr. Hsu examined and discussed care with patient and agrees with treatment plan.       Return for Next scheduled follow up.       Patient Instructions   Consider latera implant and adenoidectomy  Continue BAHA trial to right ear Thursday  Nasal ointment for 1 week before restarting both nasal sprays  Return for problems    CONTACT INFORMATION:  The main office phone number is [phone]. For emergencies after hours and on weekends, this number will convert over to our answering service and the on call provider will answer. Please try to keep non emergent phone calls/ questions to office hours 9am-5pm Monday through Friday.      [employer]  As an alternative, you can sign up and use the Epic MyChart system for more direct and quicker access for non emergent questions/ problems.  [employer]trust allows you to send messages to your doctor, view your test results, renew your prescriptions, schedule appointments, and more. To sign up, go to [employer] and click on the Sign Up Now link in the New User? box. Enter your [employer] Activation Code exactly as it appears below along with the last four digits of your Social Security Number and your Date of Birth () to complete the sign-up process. If you do not sign up before the expiration date, you must request a new code.     [employer] Activation Code: Activation code not generated  Current [employer] Status: Active     If you have questions, you can email [email or web address] or call [phone] to talk to our [employer] staff. Remember, [employer] is NOT to be used for urgent needs. For medical emergencies, dial 911.     IF YOU SMOKE OR USE TOBACCO PLEASE READ THE FOLLOWING:  Why is smoking bad for me?  Smoking increases the risk of heart disease, lung disease, vascular  disease, stroke, and cancer. If you smoke, STOP!        IF YOU SMOKE OR USE TOBACCO PLEASE READ THE FOLLOWING:  Why is smoking bad for me?  Smoking increases the risk of heart disease, lung disease, vascular disease, stroke, and cancer. If you smoke, STOP!     For more information:  Quit Now [place]  1-800-QUIT-NOW  https://[place].quitlogix.org/en-US/

## 2023-08-14 NOTE — PROGRESS NOTES
AUDIOMETRIC EVALUATION      Name:  Nani Anthony  :  1960  Age:  63 y.o.  Date of Evaluation:  08/15/2023       History:  Reason for visit:  Ms. Anthony is seen today at the request of BOB Rodríguez for a hearing evaluation. Patient was seen by ENT provider on 2023 with complaints of right ear pain and hearing loss. She states she had a cholesteatoma removed on the left side around  and a cholesteatoma removed from the right side around . She also reports she has had mastoid surgery on both sides. She currently wears a Baha 5 SP (SN:[id number]) on the left side. She reports she had the baha surgery about 5 years ago in [place].    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  Aural Fullness:  no, both ears  Otalgia: yes, both ears  Family history of hearing loss: yes, parents  Other significant history:  cpap use   Head trauma requiring hospital stay: no  Previous brain MRI: yes, a few years ago      EVALUATION:        RESULTS:    Otoscopic Evaluation:  Bilateral: abnormal tympanic membrane visualized- surgery ear    Tympanometry (226 Hz):  Bilateral: Type A- normal, with large ear canal volume    Pure Tone Audiometry (via inserts with good reliability):    Bilateral: moderately severe precipitously sloping to profound mixed hearing loss    Speech Audiometry:   Right: Speech Reception Threshold (SRT) was obtained at 90 dBHL  Word Recognition scores-  40% (very poor, <52%)   Presented at 110dBHL   Using NU-6 List 4A, 25 words  Left: Speech Reception Threshold (SRT) was obtained at 90 dBHL  Word Recognition scores-  68% (fair, 66-76%)   Presented at 110dBHL  Using NU-6 List 4A, 25 words  NOTE: presented at max output of audiometer    IMPRESSIONS:  Tympanometry showed normal middle ear pressure and static compliance, for both ears;  however, large ear canal volumes were noted bilaterally, likely from previous ear surgery. Pure tone thresholds for both ears show a moderately severe sloping to profound mixed hearing loss, suggesting abnormal outer/middle ear function and abnormal cochlear/retrocochlear function. Patient was counseled with regard to the findings.    Amplification needs:  Patient currently wears a baha on the left side. She may benefit from a baha on the right side.    Diagnosis:  1. Mixed conductive and sensorineural hearing loss of both ears    2. Tinnitus of both ears    3. Otalgia of both ears    4. Dizziness    5. Presence of other otological and audiological implants    6. H/O cholesteatoma    7. H/O mastoidectomy         RECOMMENDATIONS/PLAN:  Follow-up recommendations per Chandler Hsu MD    Follow-up baha programming on 08/17/2023  Audiologic follow-up in 1 year  Return for audiologic testing if noticing changes in hearing or concerns arise  Baha counseling upon medical clearance and patient motivation- right side  Use communication strategies  Use hearing protection around loud noises  Avoid silence when possible. Sleep with white noise/fan, or listen to nature sounds    EDUCATION:  Discussed results and recommendations with patient. Questions were addressed and the patient was encouraged to contact our department should concerns arise.        Caroline Scruggs [facility]  Licensed Audiologist

## 2023-08-15 ENCOUNTER — OFFICE VISIT (OUTPATIENT)
Dept: OTOLARYNGOLOGY | Facility: CLINIC | Age: 63
End: 2023-08-15
Payer: MEDICARE

## 2023-08-15 ENCOUNTER — PROCEDURE VISIT (OUTPATIENT)
Dept: OTOLARYNGOLOGY | Facility: CLINIC | Age: 63
End: 2023-08-15
Payer: MEDICARE

## 2023-08-15 VITALS
BODY MASS INDEX: 22.34 KG/M2 | DIASTOLIC BLOOD PRESSURE: 89 MMHG | HEART RATE: 59 BPM | SYSTOLIC BLOOD PRESSURE: 131 MMHG | TEMPERATURE: 96.9 F | HEIGHT: 66 IN | WEIGHT: 139 LBS

## 2023-08-15 DIAGNOSIS — Z96.29 PRESENCE OF OTHER OTOLOGICAL AND AUDIOLOGICAL IMPLANTS: ICD-10-CM

## 2023-08-15 DIAGNOSIS — H90.6 MIXED CONDUCTIVE AND SENSORINEURAL HEARING LOSS OF BOTH EARS: ICD-10-CM

## 2023-08-15 DIAGNOSIS — R09.82 POST-NASAL DRIP: ICD-10-CM

## 2023-08-15 DIAGNOSIS — Z86.69 H/O CHOLESTEATOMA: ICD-10-CM

## 2023-08-15 DIAGNOSIS — H93.13 TINNITUS OF BOTH EARS: ICD-10-CM

## 2023-08-15 DIAGNOSIS — R09.81 NASAL CONGESTION: Primary | ICD-10-CM

## 2023-08-15 DIAGNOSIS — Z90.89 H/O MASTOIDECTOMY: ICD-10-CM

## 2023-08-15 DIAGNOSIS — H92.03 OTALGIA OF BOTH EARS: ICD-10-CM

## 2023-08-15 DIAGNOSIS — R42 DIZZINESS: ICD-10-CM

## 2023-08-15 DIAGNOSIS — H90.6 MIXED CONDUCTIVE AND SENSORINEURAL HEARING LOSS OF BOTH EARS: Primary | ICD-10-CM

## 2023-08-15 DIAGNOSIS — J32.9 RECURRENT SINUS INFECTIONS: ICD-10-CM

## 2023-08-15 DIAGNOSIS — J34.89 SINUS PAIN: ICD-10-CM

## 2023-08-15 DIAGNOSIS — J35.2 ADENOID HYPERTROPHY: ICD-10-CM

## 2023-08-15 RX ORDER — FAMOTIDINE 40 MG/1
1 TABLET, FILM COATED ORAL AS NEEDED
COMMUNITY

## 2023-08-15 NOTE — PATIENT INSTRUCTIONS
Consider latera implant and adenoidectomy  Continue BAHA trial to right ear Thursday  Nasal ointment for 1 week before restarting both nasal sprays  Return for problems    CONTACT INFORMATION:  The main office phone number is [phone]. For emergencies after hours and on weekends, this number will convert over to our answering service and the on call provider will answer. Please try to keep non emergent phone calls/ questions to office hours 9am-5pm Monday through Friday.      [employer]  As an alternative, you can sign up and use the Epic MyChart system for more direct and quicker access for non emergent questions/ problems.  [facility] [place] [employer] allows you to send messages to your doctor, view your test results, renew your prescriptions, schedule appointments, and more. To sign up, go to [employer] and click on the Sign Up Now link in the New User? box. Enter your [place] [employer] Activation Code exactly as it appears below along with the last four digits of your Social Security Number and your Date of Birth () to complete the sign-up process. If you do not sign up before the expiration date, you must request a new code.     [employer] Activation Code: Activation code not generated  Current [place] [employer] Status: Active     If you have questions, you can email [email or web address] or call [phone] to talk to our [employer] staff. Remember, [place] [employer] is NOT to be used for urgent needs. For medical emergencies, dial 911.     IF YOU SMOKE OR USE TOBACCO PLEASE READ THE FOLLOWING:  Why is smoking bad for me?  Smoking increases the risk of heart disease, lung disease, vascular disease, stroke, and cancer. If you smoke, STOP!        IF YOU SMOKE OR USE TOBACCO PLEASE READ THE FOLLOWING:  Why is smoking bad for me?  Smoking increases the risk of heart disease, lung disease, vascular disease, stroke, and cancer. If you smoke, STOP!     For more information:  Quit Now  [place]  1-800-QUIT-NOW  https://[place].quitlogix.org/en-US/

## 2023-08-17 ENCOUNTER — PROCEDURE VISIT (OUTPATIENT)
Dept: OTOLARYNGOLOGY | Facility: CLINIC | Age: 63
End: 2023-08-17
Payer: MEDICARE

## 2023-08-17 DIAGNOSIS — H90.6 MIXED CONDUCTIVE AND SENSORINEURAL HEARING LOSS OF BOTH EARS: ICD-10-CM

## 2023-08-17 DIAGNOSIS — Z96.29 PRESENCE OF OTHER OTOLOGICAL AND AUDIOLOGICAL IMPLANTS: ICD-10-CM

## 2023-08-17 DIAGNOSIS — Z86.69 H/O CHOLESTEATOMA: ICD-10-CM

## 2023-08-17 DIAGNOSIS — Z45.320 ENCOUNTER FOR ADJUSTMENT AND MANAGEMENT OF BONE CONDUCTION DEVICE: Primary | ICD-10-CM

## 2023-08-17 DIAGNOSIS — Z90.89 H/O MASTOIDECTOMY: ICD-10-CM

## 2023-08-17 NOTE — PROGRESS NOTES
Baha Programming      Name:  Nani Anthony  :  1960  Age:  63 y.o.  Date of Evaluation:  2023     Surgery Date: Unknown  Fit Date: Unknown   Side: Left  Device/SN: Baha 5 SP (SN:[id number])   Accessory/SN: MiniMicrophone (SN:[id number])    History:  Reason for visit:  Ms. Anthony is seen today to have their Baha 5 SP programmed. Today, we programmed her device from the audio completed on 08/15/2023. We ran feedback manager, BC direct, and kept the gain that was prescribed accordingly. Patient was very satisfied with the changes. She states that it sounds better and more crisp. I also paired her MiniMicrophone to her device and showed her how to use it. Patient is interested in obtaining another device for the right side. She currently wears her Baha 5 SP clipped to her shirt and likes how it sounds. We discussed how she has the option of an OSIA for the right or another Baha 5 SP. She is leaning towards another Baha 5 SP at this time. She also would like a TV streamer as her accessory item. I will call [facility] and see what their recommendation is for this situation and then I will call patient for a follow-up appointment. Patient is also wondering if she is able to upgrade her current device. She will call if changes need to made or if she has any questions.       Diagnosis:   1. Encounter for adjustment and management of bone conduction device    2. Presence of other otological and audiological implants    3. Mixed conductive and sensorineural hearing loss of both ears    4. H/O mastoidectomy    5. H/O cholesteatoma        RECOMMENDATIONS/PLAN:  Follow-up recommendations per MD Yesica Koo, [facility]  Licensed Audiologist

## 2023-08-18 ENCOUNTER — OFFICE VISIT (OUTPATIENT)
Dept: OBSTETRICS AND GYNECOLOGY | Facility: CLINIC | Age: 63
End: 2023-08-18
Payer: MEDICARE

## 2023-08-18 VITALS
DIASTOLIC BLOOD PRESSURE: 76 MMHG | HEIGHT: 66 IN | SYSTOLIC BLOOD PRESSURE: 120 MMHG | BODY MASS INDEX: 23.95 KG/M2 | WEIGHT: 149 LBS

## 2023-08-18 DIAGNOSIS — Z12.31 ENCOUNTER FOR SCREENING MAMMOGRAM FOR MALIGNANT NEOPLASM OF BREAST: ICD-10-CM

## 2023-08-18 DIAGNOSIS — Z01.419 ENCOUNTER FOR GYNECOLOGICAL EXAMINATION WITHOUT ABNORMAL FINDING: Primary | ICD-10-CM

## 2023-08-18 DIAGNOSIS — Z78.0 POSTMENOPAUSE: ICD-10-CM

## 2023-08-18 DIAGNOSIS — Z13.820 OSTEOPOROSIS SCREENING: ICD-10-CM

## 2023-08-18 DIAGNOSIS — N39.44 NOCTURNAL ENURESIS: ICD-10-CM

## 2023-08-18 PROCEDURE — G0123 SCREEN CERV/VAG THIN LAYER: HCPCS | Performed by: NURSE PRACTITIONER

## 2023-08-18 PROCEDURE — 87624 HPV HI-RISK TYP POOLED RSLT: CPT | Performed by: NURSE PRACTITIONER

## 2023-08-18 RX ORDER — SIMETHICONE 80 MG
80 TABLET,CHEWABLE ORAL
COMMUNITY
Start: 2023-03-09

## 2023-08-18 NOTE — PROGRESS NOTES
Chief Complaint   Patient presents with    Gynecologic Exam     Patient is new to our office and here to establish GYN care.  Referred by pcp Hector Heredia in [facility] for hx of Herpes Labialis. Last pap/GYN exam 3/22/22, pap normal/-HPV. Last mammo [facility] 2022, normal per pt, last DEXA normal per pt maybe 1 year ago.  Pt c/o nocturia and possible prolapse. Pt denies current pelvic pain, abnormal vaginal bleeding or discharge, dyspareunia, or urinary incontinence.         History:  Nani Anthony is a 63 y.o. female who presents today for evaluation of the above problems.      She is a new patient today  Nani Anthony is a 63 y.o. female here today for annual GYN examination. She is menopausal and has not had any recent abnormal Pap smears. Last pap 03/2022 and was normal. She denies any vaginal discharge or bleeding. She is not on hormone replacement therapy.  Her last mammogram was in 2022 and read as BIRADS normal. She has no specific complaints today and denies abdominal or pelvic pain.           ROS:  Review of Systems   Constitutional: Negative.    HENT: Negative.     Eyes: Negative.    Respiratory: Negative.     Cardiovascular: Negative.    Gastrointestinal: Negative.    Endocrine: Negative.    Genitourinary: Negative.  Positive for pelvic pressure.   Musculoskeletal: Negative.    Skin: Negative.    Neurological: Negative.    Psychiatric/Behavioral: Negative.       Allergies   Allergen Reactions    Baclofen Palpitations    Atorvastatin Other (See Comments)    Escitalopram Other (See Comments)    Iodinated Contrast Media Other (See Comments)    Pregabalin Other (See Comments)    Sulfa Antibiotics Nausea Only    Valtrex [Valacyclovir] GI Intolerance    Iodine Rash     Past Medical History:   Diagnosis Date    Allergic rhinitis Years ago    Dental disease Years    Dizziness Years    Fracture of nasal bones 1978    GERD (gastroesophageal reflux disease) 2014    Headache Off and on for years    HL  (hearing loss) Years    Sleep apnea 2021    Tinnitus For years    TMJ dysfunction 2021     Past Surgical History:   Procedure Laterality Date    BREAST SURGERY  1986    COLONOSCOPY N/A 06/17/2020    Procedure: COLONOSCOPY;  Surgeon: Pool Wolfe DO;  Location: [facility] ENDOSCOPY;  Service: Gastroenterology;  Laterality: N/A;    COSMETIC SURGERY  1998    ENDOSCOPY N/A 06/17/2020    Procedure: ESOPHAGOGASTRODUODENOSCOPY;  Surgeon: Pool Wolfe DO;  Location: [facility] ENDOSCOPY;  Service: Gastroenterology;  Laterality: N/A;    TONSILLECTOMY  1963     Family History   Problem Relation Age of Onset    Heart failure Father     Diabetes Mother     Cancer Brother     Rashes / Skin problems Brother     Cancer Sister     Rheum arthritis Sister     Diabetes Maternal Grandmother     Breast cancer Paternal Aunt     Ovarian cancer Neg Hx     Uterine cancer Neg Hx     Colon cancer Neg Hx     Melanoma Neg Hx       reports that she has been smoking cigarettes. She has a 52.00 pack-year smoking history. She has never used smokeless tobacco. She reports that she does not currently use alcohol. She reports current drug use. Drug: Marijuana.      Current Outpatient Medications:     aspirin 81 MG EC tablet, Take 1 tablet by mouth Daily. PCP, Disp: , Rfl:     azelastine (ASTELIN) 0.1 % nasal spray, 2 sprays into the nostril(s) as directed by provider 2 (Two) Times a Day. Use in each nostril as directed, Disp: 30 mL, Rfl: 11    cyclobenzaprine (FLEXERIL) 5 MG tablet, Take 1 tablet by mouth 3 (Three) Times a Day As Needed for Muscle Spasms., Disp: , Rfl:     diazePAM (VALIUM) 2 MG tablet, Take 1 tablet by mouth 2 (Two) Times a Day As Needed for Anxiety., Disp: , Rfl:     famotidine (PEPCID) 40 MG tablet, Take 1 tablet by mouth As Needed., Disp: , Rfl:     fluticasone (FLONASE) 50 MCG/ACT nasal spray, 2 sprays into the nostril(s) as directed by provider Daily., Disp: 16 g, Rfl: 11    mupirocin (BACTROBAN) 2 % ointment, APPLY 1  "APPLICATION INTO THE NOSTRILS AS DIRECTED TWICE A DAY FOR 14 DAYS, Disp: , Rfl:     Psyllium (METAMUCIL PO), 0 Refill(s), Disp: , Rfl:     simethicone (MYLICON) 80 MG chewable tablet, 1 tablet., Disp: , Rfl:     OBJECTIVE:  /76   Ht 166.4 cm (65.5\")   Wt 67.6 kg (149 lb)   BMI 24.42 kg/mý    Physical Exam  Exam conducted with a chaperone present.   Constitutional:       Appearance: She is well-developed.   HENT:      Head: Normocephalic and atraumatic.   Eyes:      General: Lids are normal.      Conjunctiva/sclera: Conjunctivae normal.      Pupils: Pupils are equal, round, and reactive to light.   Neck:      Thyroid: No thyromegaly.   Cardiovascular:      Rate and Rhythm: Normal rate and regular rhythm.      Heart sounds: Normal heart sounds.   Pulmonary:      Effort: Pulmonary effort is normal.      Breath sounds: Normal breath sounds.   Chest:   Breasts:     Breasts are symmetrical.      Right: No inverted nipple, mass, nipple discharge, skin change or tenderness.      Left: No inverted nipple, mass, nipple discharge, skin change or tenderness.   Abdominal:      General: Bowel sounds are normal.      Palpations: Abdomen is soft.      Hernia: A hernia is present. There is no hernia in the left inguinal area or right inguinal area.   Genitourinary:     Exam position: Supine.      Labia:         Right: No rash, tenderness, lesion or injury.         Left: No rash, tenderness, lesion or injury.       Urethra: No prolapse.      Vagina: No signs of injury and foreign body. No vaginal discharge, erythema, tenderness or bleeding.      Cervix: No cervical motion tenderness, discharge or friability.      Uterus: Not deviated, not enlarged, not fixed and not tender.       Adnexa:         Right: No mass, tenderness or fullness.          Left: No mass, tenderness or fullness.        Rectum: Normal. No tenderness or external hemorrhoid.      Comments: Small vaginal vault. No prolapse   Musculoskeletal:         General: " Normal range of motion.      Cervical back: Normal range of motion and neck supple.   Skin:     General: Skin is warm and dry.   Neurological:      Mental Status: She is alert and oriented to person, place, and time.       Assessment/Plan    Diagnoses and all orders for this visit:    1. Encounter for gynecological examination without abnormal finding (Primary)  -     Liquid-based Pap Smear, Screening  Immunizations:      - Tetanus: Unknown or >10 years ago. Recommend to have at pharmacy or on injury.      - Influenza: recommended annually      - Pneumovax:once after age 65      - Prevnar: Once after age 65      - Zostavax: Once after age 60  Colon Cancer Screening: Due 2030  Mammogram: order placed.   PAP: discussed guidelines. Patient request screening today.   DEXA: order placed  COVID vaccine information is available at vaccine.[place].gov        2. Osteoporosis screening  -     dexa bone density axial; Future    3. Encounter for screening mammogram for malignant neoplasm of breast  -     Cancel: Mammo Screening Digital Tomosynthesis Bilateral With CAD; Future  -     Mammo Screening Digital Tomosynthesis Bilateral With CAD; Future    4. Postmenopause  -     dexa bone density axial; Future    5. Nocturnal enuresis  Comments:  stable off elavil    We will notify her when the Pap smear results return. She will schedule a mammogram.  She will followup in one year or sooner if needed.     An After Visit Summary was printed and given to the patient at discharge.  Return in about 2 years (around 8/18/2025) for Annual physical.          Amy ROJAS 8/18/2023   Electronically signed

## 2023-08-21 LAB
GEN CATEG CVX/VAG CYTO-IMP: NORMAL
HPV I/H RISK 4 DNA CVX QL PROBE+SIG AMP: NOT DETECTED
LAB AP CASE REPORT: NORMAL
LAB AP GYN ADDITIONAL INFORMATION: NORMAL
Lab: NORMAL
PATH INTERP SPEC-IMP: NORMAL
STAT OF ADQ CVX/VAG CYTO-IMP: NORMAL

## 2023-08-31 ENCOUNTER — TELEPHONE (OUTPATIENT)
Dept: OTOLARYNGOLOGY | Facility: CLINIC | Age: 63
End: 2023-08-31
Payer: MEDICARE

## 2023-08-31 DIAGNOSIS — Z13.820 OSTEOPOROSIS SCREENING: ICD-10-CM

## 2023-08-31 DIAGNOSIS — Z78.0 POSTMENOPAUSE: ICD-10-CM

## 2023-08-31 NOTE — TELEPHONE ENCOUNTER
Spoke with patient and scheduled a Baha follow-up for 09/07/2023 at 3:00. She confirmed the appointment. She also stated she does not feel good and is unsure of what she was supposed to do after her previous appointment with the ENT provider. I informed her I would speak with his nurse practitioner and have someone call her.    Caroline Joyner CCC-A  Licensed Audiologist

## 2023-09-07 ENCOUNTER — PROCEDURE VISIT (OUTPATIENT)
Dept: OTOLARYNGOLOGY | Facility: CLINIC | Age: 63
End: 2023-09-07
Payer: MEDICARE

## 2023-09-07 DIAGNOSIS — Z90.89 H/O MASTOIDECTOMY: ICD-10-CM

## 2023-09-07 DIAGNOSIS — H90.6 MIXED CONDUCTIVE AND SENSORINEURAL HEARING LOSS OF BOTH EARS: ICD-10-CM

## 2023-09-07 DIAGNOSIS — Z96.29 PRESENCE OF OTHER OTOLOGICAL AND AUDIOLOGICAL IMPLANTS: ICD-10-CM

## 2023-09-07 DIAGNOSIS — Z45.320 ENCOUNTER FOR ADJUSTMENT AND MANAGEMENT OF BONE CONDUCTION DEVICE: Primary | ICD-10-CM

## 2023-09-07 DIAGNOSIS — Z86.69 H/O CHOLESTEATOMA: ICD-10-CM

## 2023-09-07 PROCEDURE — 92700 UNLISTED ORL SERVICE/PX: CPT

## 2023-09-07 NOTE — PROGRESS NOTES
Baha Evaluation      Name:  Nani Anthony  :  1960  Age:  63 y.o.  Date of Evaluation:  2023       Surgery Date: Unknown  Fit Date: Unknown        Side: Left  Device/SN: Baha 5 SP (SN:[id number])              Accessory/SN: MiniMicrophone (SN:[id number])    History:  Reason for visit:  Ms. Anthony is seen today for a follow-up Baha evaluation. I spoke with Cochlear about her case to better decide which device to order for her surgery. We were going to try a Baha 6 to see if she liked how it sounded at an ear level, but she explained she had a lot of difficulty with feedback and would like to stick with the Baha 5 SP. She states she loves her current device worn at chest level and would like to have the same thing for the other side. I also spoke to cochlear to see how old her current Baha 5 SP is. They stated that the warranty on it ends 10/31/2023. Patient also explained her current device is a little loud, so we turned the loudness down one notch towards softer for both program 1 and 2. I also removed the feedback manager since it is not recommended when the device is worn at chest level. We will move forward with ordering a Baha 5 SP for the right side, however, patient's insurance changes at the end of the month. I will speak with Chandler Hsu MD to determine if the surgery will be able to be schedule prior to this or after. If it is after, then I will order her new device after her insurance changes.      Diagnosis:   1. Encounter for adjustment and management of bone conduction device    2. Presence of other otological and audiological implants    3. Mixed conductive and sensorineural hearing loss of both ears    4. H/O mastoidectomy    5. H/O cholesteatoma          RECOMMENDATIONS/PLAN:  Follow-up recommendations per Chandler Hsu MD     EDUCATION:  Discussed results and recommendations with patient. Questions were addressed and the patient was encouraged to contact our department  should concerns arise.

## 2023-09-15 ENCOUNTER — TELEPHONE (OUTPATIENT)
Dept: OTOLARYNGOLOGY | Facility: CLINIC | Age: 63
End: 2023-09-15
Payer: MEDICARE

## 2023-09-15 NOTE — TELEPHONE ENCOUNTER
I spoke with patient about scheduling surgery. She states that she is having a change of insurance in October to [employer]. Discussed that the contract for [employer] was up for negotiation so patient would like to hold off on scheduling surgery until it is confirmed that we will take her insurance

## 2023-09-18 ENCOUNTER — TELEPHONE (OUTPATIENT)
Dept: OTOLARYNGOLOGY | Facility: CLINIC | Age: 63
End: 2023-09-18
Payer: MEDICARE

## 2023-09-18 NOTE — TELEPHONE ENCOUNTER
Patient reports she is still having feedback issues with her Baha. I informed her I had an opening on Friday 9/22/2023 at 9:00am. She confirmed the appointment.    Caroline Joyner CCC-A  Licensed Audiologist

## 2023-09-22 ENCOUNTER — PROCEDURE VISIT (OUTPATIENT)
Dept: OTOLARYNGOLOGY | Facility: CLINIC | Age: 63
End: 2023-09-22
Payer: MEDICARE

## 2023-09-22 DIAGNOSIS — Z86.69 H/O CHOLESTEATOMA: ICD-10-CM

## 2023-09-22 DIAGNOSIS — Z96.29 PRESENCE OF OTHER OTOLOGICAL AND AUDIOLOGICAL IMPLANTS: ICD-10-CM

## 2023-09-22 DIAGNOSIS — Z90.89 H/O MASTOIDECTOMY: ICD-10-CM

## 2023-09-22 DIAGNOSIS — Z45.320 ENCOUNTER FOR ADJUSTMENT AND MANAGEMENT OF BONE CONDUCTION DEVICE: Primary | ICD-10-CM

## 2023-09-22 DIAGNOSIS — H90.6 MIXED CONDUCTIVE AND SENSORINEURAL HEARING LOSS OF BOTH EARS: ICD-10-CM

## 2023-09-22 PROCEDURE — 92700 UNLISTED ORL SERVICE/PX: CPT

## 2023-09-22 NOTE — PROGRESS NOTES
"Baha Follow-up Fitting      Name:  Nani Anthony  :  1960  Age:  63 y.o.  Date of Evaluation:  2023       Surgery Date: Unknown  Fit Date: Unknown        Side: Left  Device/SN: Baha 5 SP (SN:[id number])              Accessory/SN: MiniMicrophone (SN:[id number])     History:  Reason for visit:  Ms. Anthony is seen today for a follow-up Baha fitting. She states she is still having feedback problems. Prior to programming on 2023, she had it set to \"same ear\" with feedback manager, even though she wore it as \"clip on\". When I programmed it the first time, I had it set to \"clip on\" and ran feedback manager. On 2023, I deleted the feedback manager, since the software suggests you don't run this if it is not worn on the ear. Today, I changed to \"clip on\" and ran feedback manager again. If she continues to have problems with feedback, I will change it back to \"same ear\", even though she wears it on the chest, and then run feedback manager. This will mimic the settings she had when she originally visited the office. I also programmed a Baha 6 for her to try. Her hearing loss is to great for this device, so she wants to move forward with another Baha 5 SP. We still need to wait to find out more information about her insurance prior to ordering the device.        Diagnosis:   1. Encounter for adjustment and management of bone conduction device    2. Presence of other otological and audiological implants    3. Mixed conductive and sensorineural hearing loss of both ears    4. H/O mastoidectomy    5. H/O cholesteatoma        RECOMMENDATIONS/PLAN:  Follow-up recommendations per Chandler Hsu MD   Follow-up if she continues to have feedback problems  Determine if her insurance will work and then we will order device      Caroline Scruggs [facility]-A  Licensed Audiologist  "

## 2023-09-28 ENCOUNTER — TELEPHONE (OUTPATIENT)
Dept: OTOLARYNGOLOGY | Facility: CLINIC | Age: 63
End: 2023-09-28
Payer: MEDICARE

## 2023-09-28 NOTE — TELEPHONE ENCOUNTER
Patient called to ask about whether [facility] and [facility] were able to come to an agreement. I told her that from my understanding, [facility] is out of [employer] network. I recommended she contact her insurance for further information.    Caroline Joyner [facility]-A  Licensed Audiologist

## 2023-10-03 ENCOUNTER — TELEPHONE (OUTPATIENT)
Dept: OBSTETRICS AND GYNECOLOGY | Facility: CLINIC | Age: 63
End: 2023-10-03
Payer: MEDICARE

## 2023-10-18 ENCOUNTER — TELEPHONE (OUTPATIENT)
Dept: NEUROLOGY | Age: 63
End: 2023-10-18

## 2023-10-18 NOTE — TELEPHONE ENCOUNTER
Pt says that she is experiencing vibrations and numbness from time to time to time, didn't name specific body part also mentioned, roaring and chills, Please call her back, I was unable to find anything with until November with [facility], thanks !

## 2023-10-20 ENCOUNTER — OFFICE VISIT (OUTPATIENT)
Dept: NEUROLOGY | Age: 63
End: 2023-10-20
Payer: MEDICARE

## 2023-10-20 VITALS
HEIGHT: 65 IN | WEIGHT: 158 LBS | BODY MASS INDEX: 26.33 KG/M2 | DIASTOLIC BLOOD PRESSURE: 73 MMHG | HEART RATE: 55 BPM | SYSTOLIC BLOOD PRESSURE: 121 MMHG

## 2023-10-20 DIAGNOSIS — F41.9 ANXIETY AND DEPRESSION: ICD-10-CM

## 2023-10-20 DIAGNOSIS — R41.3 MEMORY LOSS: ICD-10-CM

## 2023-10-20 DIAGNOSIS — M79.2 NEURALGIA: Primary | ICD-10-CM

## 2023-10-20 DIAGNOSIS — F32.A ANXIETY AND DEPRESSION: ICD-10-CM

## 2023-10-20 PROCEDURE — G8484 FLU IMMUNIZE NO ADMIN: HCPCS | Performed by: PSYCHIATRY & NEUROLOGY

## 2023-10-20 PROCEDURE — G8419 CALC BMI OUT NRM PARAM NOF/U: HCPCS | Performed by: PSYCHIATRY & NEUROLOGY

## 2023-10-20 PROCEDURE — G8399 PT W/DXA RESULTS DOCUMENT: HCPCS | Performed by: PSYCHIATRY & NEUROLOGY

## 2023-10-20 PROCEDURE — G8427 DOCREV CUR MEDS BY ELIG CLIN: HCPCS | Performed by: PSYCHIATRY & NEUROLOGY

## 2023-10-20 PROCEDURE — 1123F ACP DISCUSS/DSCN MKR DOCD: CPT | Performed by: PSYCHIATRY & NEUROLOGY

## 2023-10-20 PROCEDURE — 99214 OFFICE O/P EST MOD 30 MIN: CPT | Performed by: PSYCHIATRY & NEUROLOGY

## 2023-10-20 PROCEDURE — 3017F COLORECTAL CA SCREEN DOC REV: CPT | Performed by: PSYCHIATRY & NEUROLOGY

## 2023-10-20 PROCEDURE — 4004F PT TOBACCO SCREEN RCVD TLK: CPT | Performed by: PSYCHIATRY & NEUROLOGY

## 2023-10-20 PROCEDURE — 1090F PRES/ABSN URINE INCON ASSESS: CPT | Performed by: PSYCHIATRY & NEUROLOGY

## 2023-10-20 RX ORDER — FLUTICASONE PROPIONATE 50 MCG
2 SPRAY, SUSPENSION (ML) NASAL DAILY
COMMUNITY
Start: 2023-06-21

## 2023-10-20 RX ORDER — AZELASTINE 1 MG/ML
2 SPRAY, METERED NASAL 2 TIMES DAILY
COMMUNITY
Start: 2023-06-21

## 2023-10-20 NOTE — PROGRESS NOTES
Review of Systems    Constitutional - No fever yes chills. No diaphoresis or significant fatigue. HENT -  yes tinnitus or significant hearing loss. Eyes - no sudden vision change or eye pain  Respiratory - no significant shortness of breath or cough  Cardiovascular - no chest pain No palpitations or significant leg swelling  Gastrointestinal - no abdominal swelling or pain. Genitourinary - No difficulty urinating, dysuria  Musculoskeletal - no back pain or myalgia. Skin - no color change or rash  Neurologic - No seizures. No lateralizing weakness. Hematologic - no easy bruising or excessive bleeding. Psychiatric - no severe anxiety or nervousness. All other review of systems are negative.
examination initially including bedside cognitive screen was relatively unremarkable. She had a trace tremor in the hands. Based upon her history and examination, her sharp pains are suggestive of a neuralgia. At this time she is off Trileptal  Her neurocognitive test to better assess her cognitive issues revealed evidence of moderate impairment in executive function, information processing, and verbal function of unclear significance. This was nondiagnostic. The patient indicated understanding of the management plan. We may try medicine for memory at another visit. She did not wish to start medicine for tremor. She may consider nerve block. She is to follow up with me on a PRN basis and call with any issues. Plan    No orders of the defined types were placed in this encounter. No orders of the defined types were placed in this encounter. Return if symptoms worsen or fail to improve. EMR Dragon/transcription disclaimer:Significant part of this  encounter note is electronic transcription/translation of spoken language to printed text. The electronic translation of spoken language may be erroneous, or at times, nonsensical words or phrases may be inadvertently transcribed.  Although I have reviewed the note for such errors, some may still exist.

## 2023-11-21 ENCOUNTER — TELEPHONE (OUTPATIENT)
Dept: OTOLARYNGOLOGY | Facility: CLINIC | Age: 63
End: 2023-11-21
Payer: MEDICARE

## 2023-11-21 NOTE — TELEPHONE ENCOUNTER
Patient left message stating she was able to change her insurance to [place] [employer] [place] [employer] [facility]. She would like to change providers to Marcos Batista MD in January.    Caroline Joyner [facility]-A  Licensed Audiologist

## 2023-11-22 ENCOUNTER — TELEPHONE (OUTPATIENT)
Dept: OTOLARYNGOLOGY | Facility: CLINIC | Age: 63
End: 2023-11-22
Payer: MEDICARE

## 2023-11-28 NOTE — TELEPHONE ENCOUNTER
Spoke with patient who stated that she is going to see her old ENT for a second opinion. She said she would call if she needed any further appointments.

## (undated) DEVICE — BITEBLOCK ENDO W/STRAP 60F A/ LF DISP

## (undated) DEVICE — TRAP,MUCUS SPECIMEN,40CC: Brand: MEDLINE

## (undated) DEVICE — CANN SMPL SOFTECH BIFLO ETCO2 A/M 7FT

## (undated) DEVICE — SINGLE-USE BIOPSY FORCEPS: Brand: RADIAL JAW 4